# Patient Record
Sex: MALE | Race: OTHER | Employment: FULL TIME | URBAN - METROPOLITAN AREA
[De-identification: names, ages, dates, MRNs, and addresses within clinical notes are randomized per-mention and may not be internally consistent; named-entity substitution may affect disease eponyms.]

---

## 2018-05-02 ENCOUNTER — HOSPITAL ENCOUNTER (INPATIENT)
Facility: HOSPITAL | Age: 58
LOS: 7 days | Discharge: HOME HEALTH | DRG: 234 | End: 2018-05-09
Attending: INTERNAL MEDICINE | Admitting: THORACIC SURGERY (CARDIOTHORACIC VASCULAR SURGERY)
Payer: COMMERCIAL

## 2018-05-02 ENCOUNTER — APPOINTMENT (OUTPATIENT)
Dept: INTERVENTIONAL RADIOLOGY/VASCULAR | Facility: HOSPITAL | Age: 58
DRG: 234 | End: 2018-05-02
Attending: INTERNAL MEDICINE
Payer: COMMERCIAL

## 2018-05-02 PROCEDURE — 87081 CULTURE SCREEN ONLY: CPT | Performed by: INTERNAL MEDICINE

## 2018-05-02 PROCEDURE — 4A023N7 MEASUREMENT OF CARDIAC SAMPLING AND PRESSURE, LEFT HEART, PERCUTANEOUS APPROACH: ICD-10-PCS | Performed by: INTERNAL MEDICINE

## 2018-05-02 PROCEDURE — 93458 L HRT ARTERY/VENTRICLE ANGIO: CPT

## 2018-05-02 PROCEDURE — 85730 THROMBOPLASTIN TIME PARTIAL: CPT | Performed by: INTERNAL MEDICINE

## 2018-05-02 PROCEDURE — 99153 MOD SED SAME PHYS/QHP EA: CPT

## 2018-05-02 PROCEDURE — 5A02110 ASSISTANCE WITH CARDIAC OUTPUT USING BALLOON PUMP, INTERMITTENT: ICD-10-PCS | Performed by: INTERNAL MEDICINE

## 2018-05-02 PROCEDURE — B215YZZ FLUOROSCOPY OF LEFT HEART USING OTHER CONTRAST: ICD-10-PCS | Performed by: INTERNAL MEDICINE

## 2018-05-02 PROCEDURE — 33967 INSERT I-AORT PERCUT DEVICE: CPT

## 2018-05-02 PROCEDURE — 99152 MOD SED SAME PHYS/QHP 5/>YRS: CPT

## 2018-05-02 PROCEDURE — 93010 ELECTROCARDIOGRAM REPORT: CPT | Performed by: INTERNAL MEDICINE

## 2018-05-02 PROCEDURE — 80048 BASIC METABOLIC PNL TOTAL CA: CPT | Performed by: INTERNAL MEDICINE

## 2018-05-02 PROCEDURE — 85027 COMPLETE CBC AUTOMATED: CPT | Performed by: INTERNAL MEDICINE

## 2018-05-02 PROCEDURE — B211YZZ FLUOROSCOPY OF MULTIPLE CORONARY ARTERIES USING OTHER CONTRAST: ICD-10-PCS | Performed by: INTERNAL MEDICINE

## 2018-05-02 PROCEDURE — 93005 ELECTROCARDIOGRAM TRACING: CPT

## 2018-05-02 RX ORDER — ATORVASTATIN CALCIUM 40 MG/1
40 TABLET, FILM COATED ORAL NIGHTLY
Status: DISCONTINUED | OUTPATIENT
Start: 2018-05-02 | End: 2018-05-09

## 2018-05-02 RX ORDER — METOPROLOL TARTRATE 5 MG/5ML
INJECTION INTRAVENOUS
Status: COMPLETED
Start: 2018-05-02 | End: 2018-05-02

## 2018-05-02 RX ORDER — ASPIRIN 325 MG
325 TABLET, DELAYED RELEASE (ENTERIC COATED) ORAL DAILY
Status: DISCONTINUED | OUTPATIENT
Start: 2018-05-03 | End: 2018-05-04

## 2018-05-02 RX ORDER — LIDOCAINE HYDROCHLORIDE 10 MG/ML
INJECTION, SOLUTION EPIDURAL; INFILTRATION; INTRACAUDAL; PERINEURAL
Status: COMPLETED
Start: 2018-05-02 | End: 2018-05-02

## 2018-05-02 RX ORDER — HEPARIN SODIUM 5000 [USP'U]/ML
INJECTION, SOLUTION INTRAVENOUS; SUBCUTANEOUS
Status: COMPLETED
Start: 2018-05-02 | End: 2018-05-02

## 2018-05-02 RX ORDER — HEPARIN SODIUM 5000 [USP'U]/ML
5000 INJECTION INTRAVENOUS; SUBCUTANEOUS ONCE
Status: COMPLETED | OUTPATIENT
Start: 2018-05-02 | End: 2018-05-02

## 2018-05-02 RX ORDER — HEPARIN SODIUM AND DEXTROSE 10000; 5 [USP'U]/100ML; G/100ML
12 INJECTION INTRAVENOUS ONCE
Status: COMPLETED | OUTPATIENT
Start: 2018-05-02 | End: 2018-05-02

## 2018-05-02 RX ORDER — MIDAZOLAM HYDROCHLORIDE 1 MG/ML
INJECTION INTRAMUSCULAR; INTRAVENOUS
Status: COMPLETED
Start: 2018-05-02 | End: 2018-05-02

## 2018-05-02 RX ORDER — HEPARIN SODIUM AND DEXTROSE 10000; 5 [USP'U]/100ML; G/100ML
INJECTION INTRAVENOUS CONTINUOUS
Status: DISCONTINUED | OUTPATIENT
Start: 2018-05-03 | End: 2018-05-04

## 2018-05-03 ENCOUNTER — APPOINTMENT (OUTPATIENT)
Dept: ULTRASOUND IMAGING | Facility: HOSPITAL | Age: 58
DRG: 234 | End: 2018-05-03
Attending: PHYSICIAN ASSISTANT
Payer: COMMERCIAL

## 2018-05-03 ENCOUNTER — APPOINTMENT (OUTPATIENT)
Dept: CV DIAGNOSTICS | Facility: HOSPITAL | Age: 58
DRG: 234 | End: 2018-05-03
Attending: INTERNAL MEDICINE
Payer: COMMERCIAL

## 2018-05-03 ENCOUNTER — ANESTHESIA EVENT (OUTPATIENT)
Dept: CARDIAC SURGERY | Facility: HOSPITAL | Age: 58
DRG: 234 | End: 2018-05-03
Payer: COMMERCIAL

## 2018-05-03 PROCEDURE — 85025 COMPLETE CBC W/AUTO DIFF WBC: CPT | Performed by: INTERNAL MEDICINE

## 2018-05-03 PROCEDURE — 80053 COMPREHEN METABOLIC PANEL: CPT | Performed by: INTERNAL MEDICINE

## 2018-05-03 PROCEDURE — 84484 ASSAY OF TROPONIN QUANT: CPT | Performed by: INTERNAL MEDICINE

## 2018-05-03 PROCEDURE — 86850 RBC ANTIBODY SCREEN: CPT | Performed by: INTERNAL MEDICINE

## 2018-05-03 PROCEDURE — 83036 HEMOGLOBIN GLYCOSYLATED A1C: CPT | Performed by: PHYSICIAN ASSISTANT

## 2018-05-03 PROCEDURE — 93306 TTE W/DOPPLER COMPLETE: CPT | Performed by: INTERNAL MEDICINE

## 2018-05-03 PROCEDURE — 93010 ELECTROCARDIOGRAM REPORT: CPT | Performed by: INTERNAL MEDICINE

## 2018-05-03 PROCEDURE — 93005 ELECTROCARDIOGRAM TRACING: CPT

## 2018-05-03 PROCEDURE — 86901 BLOOD TYPING SEROLOGIC RH(D): CPT | Performed by: INTERNAL MEDICINE

## 2018-05-03 PROCEDURE — 85730 THROMBOPLASTIN TIME PARTIAL: CPT | Performed by: INTERNAL MEDICINE

## 2018-05-03 PROCEDURE — 93880 EXTRACRANIAL BILAT STUDY: CPT | Performed by: PHYSICIAN ASSISTANT

## 2018-05-03 PROCEDURE — 86920 COMPATIBILITY TEST SPIN: CPT

## 2018-05-03 PROCEDURE — 85049 AUTOMATED PLATELET COUNT: CPT | Performed by: INTERNAL MEDICINE

## 2018-05-03 PROCEDURE — 85610 PROTHROMBIN TIME: CPT | Performed by: THORACIC SURGERY (CARDIOTHORACIC VASCULAR SURGERY)

## 2018-05-03 PROCEDURE — 85576 BLOOD PLATELET AGGREGATION: CPT | Performed by: THORACIC SURGERY (CARDIOTHORACIC VASCULAR SURGERY)

## 2018-05-03 PROCEDURE — 86900 BLOOD TYPING SEROLOGIC ABO: CPT | Performed by: INTERNAL MEDICINE

## 2018-05-03 RX ORDER — DIPHENHYDRAMINE HYDROCHLORIDE 50 MG/ML
12.5 INJECTION INTRAMUSCULAR; INTRAVENOUS EVERY 4 HOURS PRN
Status: DISCONTINUED | OUTPATIENT
Start: 2018-05-03 | End: 2018-05-04

## 2018-05-03 RX ORDER — DIPHENHYDRAMINE HCL 25 MG
25 CAPSULE ORAL EVERY 4 HOURS PRN
Status: DISCONTINUED | OUTPATIENT
Start: 2018-05-03 | End: 2018-05-04

## 2018-05-03 RX ORDER — HYDROCODONE BITARTRATE AND ACETAMINOPHEN 5; 325 MG/1; MG/1
1 TABLET ORAL EVERY 6 HOURS PRN
Status: DISCONTINUED | OUTPATIENT
Start: 2018-05-03 | End: 2018-05-04

## 2018-05-03 RX ORDER — POTASSIUM CHLORIDE 20 MEQ/1
40 TABLET, EXTENDED RELEASE ORAL ONCE
Status: COMPLETED | OUTPATIENT
Start: 2018-05-03 | End: 2018-05-03

## 2018-05-03 RX ORDER — SODIUM CHLORIDE 9 MG/ML
INJECTION, SOLUTION INTRAVENOUS CONTINUOUS
Status: DISCONTINUED | OUTPATIENT
Start: 2018-05-03 | End: 2018-05-04

## 2018-05-03 RX ORDER — ALPRAZOLAM 0.5 MG/1
0.5 TABLET ORAL EVERY 8 HOURS PRN
Status: DISCONTINUED | OUTPATIENT
Start: 2018-05-03 | End: 2018-05-09

## 2018-05-03 RX ORDER — HYDROCODONE BITARTRATE AND ACETAMINOPHEN 5; 325 MG/1; MG/1
2 TABLET ORAL EVERY 4 HOURS PRN
Status: DISCONTINUED | OUTPATIENT
Start: 2018-05-03 | End: 2018-05-04

## 2018-05-03 NOTE — PROGRESS NOTES
Met with patient, wife and sister to discuss pre op teaching, post op expectations, discharge planning, binder given. Pt works in a factor, fairly laborious job, wife does not work and can be home with him on d/c.  Discussed roles of cm/sw, cardiac rahab,

## 2018-05-03 NOTE — PLAN OF CARE
CARDIOVASCULAR - ADULT    • Maintains optimal cardiac output and hemodynamic stability Progressing    • Absence of cardiac arrhythmias or at baseline Progressing        Assumed care of pt as direct admit from [facility] [place] at 2100.  Dr. Reuben Guzmán at pt

## 2018-05-03 NOTE — H&P
.  CC: NSTEMI, chest discomfort     PCP: Erica Lozano MD    History of Present Illness: Patient is a 62year old male who does not see doctors regularly, presented with chest discomfort to [facility]. Felt like heartburn.  Ultimately, was trans CREATSERUM  1.02  1.02   GLU  89  90   CA  8.6  8.6       Recent Labs   Lab  05/03/18   0621   ALT  34   AST  90*   ALB  3.3*       Recent Labs   Lab  05/03/18   0430   TROP  14.000*       Additional Diagnostics: personally reviewed EKG- minimal LILIAM in p Right ICA/CCA Velocity Ratio:  0.8                           Left CCA Peak Systolic Velocity:  82 cm/s             Left Proximal ICA Peak Systolic Velocity:  06.07 cm/s             Left Mid ICA Peak Systolic Velocity:  47.75 cm/s             Lef

## 2018-05-03 NOTE — PROGRESS NOTES
Post Procedure Note  Left heart catheterization, left ventricular gram, bilateral selective coronary angiogram, right iliofemoral angiogram, intra-aortic balloon pump.   Cath reveals totally occluded LAD, totally occluded RCA and distal left main 50-60%–haz

## 2018-05-03 NOTE — CONSULTS
[address]    PATIENT'S NAME: Salvatore Adams   ATTENDING PHYSICIAN: Nikolas Jiang. MIREILLE Monteiro: Mario Alberto Gee M.D.    PATIENT ACCOUNT#:   [de-identified]    LOCATION:  [facility] 1 EDWP 10  MEDICAL RECORD #:   [id number]       DATE Erica Noel conclusion of the LV gram, again hemodynamic pressure measurements were obtained, and a pressure recording was performed during an aortic valve pullback.   After central aortic pressure measurements were obtained, the catheter and sheath were removed, hemos balloon pump was placed in the descending aorta over the small J-wire. The device was taped to the leg, covered with sterile Tegaderm dressing. Patient returned to a stable satisfactory condition. CONCLUSION:  Intra-aortic balloon placement.     Dict

## 2018-05-03 NOTE — PROGRESS NOTES
[facility] Cardiology Progress Note - Jordon Bacon Patient Status:  Inpatient    1960 MRN [id number]   [facility] 6NE-A Attending Vimal Mcdowell MD   Hosp Day # 1 PCP Lars Costa MD     Subjective:   The patie effort. Abdomen: Soft, non-tender. No organosplenomegally, mass or rebound, BS-present. Extremities: Without clubbing, cyanosis or edema. Peripheral pulses are 2+. Neurologic: Alert and oriented, normal affect. No motor or coordinational deficit.   Skin negative  Respiratory: denies shortness of breath, wheezing or cough   Cardiovascular:  See HPI  GI: denies nausea, vomiting,   Genital/: no dysuria,   Musculoskeletal: no joint complaints upper or lower extremities  Neuro: no sensory or motor complaint

## 2018-05-03 NOTE — CONSULTS
[facility] Cardiology Consultation NoteAlan Goodman MD    The patient was interviewed, examined, the chart was reviewed and the consult was dictated. This is a 62year old male with a chief complaint of chest pain. Impression:  1.   Non-ST segment elevation MI

## 2018-05-03 NOTE — CONSULTS
[facility]    PATIENT'S NAME: Natali Barker   ATTENDING PHYSICIAN: Garrett Pope. Adiel Linares M.D.   [facility] Amanda: Daniel García M.D.    PATIENT ACCOUNT#:   [de-identified]    [facility]  MEDICAL RECORD #:   [id number]       DATE Nora Campa sounds. HEART:  There is no jugular venous distention, carotid bruit, third heart sound, or murmur. ABDOMEN:  Soft, nontender, without mass or organomegaly. EXTREMITIES:  Without clubbing, cyanosis, or edema. Distal pulses are strong, 3+ bilaterally.

## 2018-05-03 NOTE — PROGRESS NOTES
CV Surgery       Pt seen and examined by Dr Lilly Spurling. P2Y12 results = 122. Plan for CABG tomorrow instead of today to minimize risk of bleeding. Pt and family understand.          William Martinez PA-C/Mavis   5/3/2018  8:09 AM

## 2018-05-03 NOTE — ANESTHESIA PREPROCEDURE EVALUATION
PRE-OP EVALUATION    Patient Name: Betsy Iyer    Pre-op Diagnosis: CAD    Procedure(s):  IABP                                        ip [id number]    Surgeon(s) and Role:     * Vivi Tucker MD - Primary    Pre-op vitals reviewed.   Temp: 98 °F (36.7 °C)  Pu [COMPLETED] Heparin Sodium (Porcine) 5000 UNIT/ML injection      metoprolol Tartrate (LOPRESSOR) tab 25 mg 25 mg Oral 2x Daily(Beta Blocker)   atorvastatin (LIPITOR) tab 40 mg 40 mg Oral Nightly   aspirin EC EC tab 325 mg 325 mg Oral Daily   [COMPLETED] He Comment: smoker                         Neuro/Psych      (+) depression  (+) anxiety                            Past Surgical History:  No date: TONSILLECTOMY     Smoking status: Current Every Day Smoker  0.50 Packs/day  For 35.00 Years     Smokeless tobac Use of blood product(s) discussed with: patient              Present on Admission:  **None**

## 2018-05-04 ENCOUNTER — ANESTHESIA (OUTPATIENT)
Dept: CARDIAC SURGERY | Facility: HOSPITAL | Age: 58
DRG: 234 | End: 2018-05-04
Payer: COMMERCIAL

## 2018-05-04 ENCOUNTER — SURGERY (OUTPATIENT)
Age: 58
End: 2018-05-04

## 2018-05-04 ENCOUNTER — APPOINTMENT (OUTPATIENT)
Dept: GENERAL RADIOLOGY | Facility: HOSPITAL | Age: 58
DRG: 234 | End: 2018-05-04
Attending: THORACIC SURGERY (CARDIOTHORACIC VASCULAR SURGERY)
Payer: COMMERCIAL

## 2018-05-04 PROCEDURE — P9045 ALBUMIN (HUMAN), 5%, 250 ML: HCPCS

## 2018-05-04 PROCEDURE — 82330 ASSAY OF CALCIUM: CPT

## 2018-05-04 PROCEDURE — 85347 COAGULATION TIME ACTIVATED: CPT

## 2018-05-04 PROCEDURE — 94002 VENT MGMT INPAT INIT DAY: CPT

## 2018-05-04 PROCEDURE — 82375 ASSAY CARBOXYHB QUANT: CPT | Performed by: THORACIC SURGERY (CARDIOTHORACIC VASCULAR SURGERY)

## 2018-05-04 PROCEDURE — 85576 BLOOD PLATELET AGGREGATION: CPT | Performed by: PHYSICIAN ASSISTANT

## 2018-05-04 PROCEDURE — 5A1221Z PERFORMANCE OF CARDIAC OUTPUT, CONTINUOUS: ICD-10-PCS | Performed by: THORACIC SURGERY (CARDIOTHORACIC VASCULAR SURGERY)

## 2018-05-04 PROCEDURE — 84132 ASSAY OF SERUM POTASSIUM: CPT | Performed by: INTERNAL MEDICINE

## 2018-05-04 PROCEDURE — S0028 INJECTION, FAMOTIDINE, 20 MG: HCPCS

## 2018-05-04 PROCEDURE — 93005 ELECTROCARDIOGRAM TRACING: CPT

## 2018-05-04 PROCEDURE — 84443 ASSAY THYROID STIM HORMONE: CPT | Performed by: INTERNAL MEDICINE

## 2018-05-04 PROCEDURE — 71045 X-RAY EXAM CHEST 1 VIEW: CPT | Performed by: THORACIC SURGERY (CARDIOTHORACIC VASCULAR SURGERY)

## 2018-05-04 PROCEDURE — 85730 THROMBOPLASTIN TIME PARTIAL: CPT | Performed by: INTERNAL MEDICINE

## 2018-05-04 PROCEDURE — 02100Z9 BYPASS CORONARY ARTERY, ONE ARTERY FROM LEFT INTERNAL MAMMARY, OPEN APPROACH: ICD-10-PCS | Performed by: THORACIC SURGERY (CARDIOTHORACIC VASCULAR SURGERY)

## 2018-05-04 PROCEDURE — 85049 AUTOMATED PLATELET COUNT: CPT | Performed by: THORACIC SURGERY (CARDIOTHORACIC VASCULAR SURGERY)

## 2018-05-04 PROCEDURE — 85014 HEMATOCRIT: CPT

## 2018-05-04 PROCEDURE — 80048 BASIC METABOLIC PNL TOTAL CA: CPT | Performed by: THORACIC SURGERY (CARDIOTHORACIC VASCULAR SURGERY)

## 2018-05-04 PROCEDURE — 84132 ASSAY OF SERUM POTASSIUM: CPT

## 2018-05-04 PROCEDURE — 85027 COMPLETE CBC AUTOMATED: CPT | Performed by: THORACIC SURGERY (CARDIOTHORACIC VASCULAR SURGERY)

## 2018-05-04 PROCEDURE — 84295 ASSAY OF SERUM SODIUM: CPT

## 2018-05-04 PROCEDURE — 82803 BLOOD GASES ANY COMBINATION: CPT | Performed by: THORACIC SURGERY (CARDIOTHORACIC VASCULAR SURGERY)

## 2018-05-04 PROCEDURE — 93010 ELECTROCARDIOGRAM REPORT: CPT | Performed by: INTERNAL MEDICINE

## 2018-05-04 PROCEDURE — 85018 HEMOGLOBIN: CPT | Performed by: THORACIC SURGERY (CARDIOTHORACIC VASCULAR SURGERY)

## 2018-05-04 PROCEDURE — 82803 BLOOD GASES ANY COMBINATION: CPT

## 2018-05-04 PROCEDURE — S0028 INJECTION, FAMOTIDINE, 20 MG: HCPCS | Performed by: THORACIC SURGERY (CARDIOTHORACIC VASCULAR SURGERY)

## 2018-05-04 PROCEDURE — 85730 THROMBOPLASTIN TIME PARTIAL: CPT | Performed by: THORACIC SURGERY (CARDIOTHORACIC VASCULAR SURGERY)

## 2018-05-04 PROCEDURE — 83050 HGB METHEMOGLOBIN QUAN: CPT | Performed by: THORACIC SURGERY (CARDIOTHORACIC VASCULAR SURGERY)

## 2018-05-04 PROCEDURE — 85049 AUTOMATED PLATELET COUNT: CPT | Performed by: INTERNAL MEDICINE

## 2018-05-04 PROCEDURE — 82962 GLUCOSE BLOOD TEST: CPT

## 2018-05-04 PROCEDURE — 85384 FIBRINOGEN ACTIVITY: CPT | Performed by: THORACIC SURGERY (CARDIOTHORACIC VASCULAR SURGERY)

## 2018-05-04 PROCEDURE — 06BP0ZZ EXCISION OF RIGHT SAPHENOUS VEIN, OPEN APPROACH: ICD-10-PCS | Performed by: THORACIC SURGERY (CARDIOTHORACIC VASCULAR SURGERY)

## 2018-05-04 PROCEDURE — 80061 LIPID PANEL: CPT | Performed by: INTERNAL MEDICINE

## 2018-05-04 PROCEDURE — 36430 TRANSFUSION BLD/BLD COMPNT: CPT | Performed by: THORACIC SURGERY (CARDIOTHORACIC VASCULAR SURGERY)

## 2018-05-04 PROCEDURE — 86927 PLASMA FRESH FROZEN: CPT

## 2018-05-04 PROCEDURE — 85610 PROTHROMBIN TIME: CPT | Performed by: THORACIC SURGERY (CARDIOTHORACIC VASCULAR SURGERY)

## 2018-05-04 PROCEDURE — 021109W BYPASS CORONARY ARTERY, TWO ARTERIES FROM AORTA WITH AUTOLOGOUS VENOUS TISSUE, OPEN APPROACH: ICD-10-PCS | Performed by: THORACIC SURGERY (CARDIOTHORACIC VASCULAR SURGERY)

## 2018-05-04 PROCEDURE — 83735 ASSAY OF MAGNESIUM: CPT | Performed by: THORACIC SURGERY (CARDIOTHORACIC VASCULAR SURGERY)

## 2018-05-04 RX ORDER — FAMOTIDINE 10 MG/ML
20 INJECTION, SOLUTION INTRAVENOUS 2 TIMES DAILY
Status: DISCONTINUED | OUTPATIENT
Start: 2018-05-04 | End: 2018-05-09 | Stop reason: HOSPADM

## 2018-05-04 RX ORDER — MORPHINE SULFATE 4 MG/ML
2 INJECTION, SOLUTION INTRAMUSCULAR; INTRAVENOUS
Status: DISCONTINUED | OUTPATIENT
Start: 2018-05-04 | End: 2018-05-09

## 2018-05-04 RX ORDER — CEFAZOLIN SODIUM/WATER 2 G/20 ML
2 SYRINGE (ML) INTRAVENOUS EVERY 8 HOURS
Status: COMPLETED | OUTPATIENT
Start: 2018-05-04 | End: 2018-05-06

## 2018-05-04 RX ORDER — MORPHINE SULFATE 4 MG/ML
8 INJECTION, SOLUTION INTRAMUSCULAR; INTRAVENOUS
Status: DISCONTINUED | OUTPATIENT
Start: 2018-05-04 | End: 2018-05-09

## 2018-05-04 RX ORDER — ASPIRIN 325 MG
325 TABLET, DELAYED RELEASE (ENTERIC COATED) ORAL DAILY
Status: DISCONTINUED | OUTPATIENT
Start: 2018-05-05 | End: 2018-05-09

## 2018-05-04 RX ORDER — MORPHINE SULFATE 4 MG/ML
4 INJECTION, SOLUTION INTRAMUSCULAR; INTRAVENOUS
Status: DISCONTINUED | OUTPATIENT
Start: 2018-05-04 | End: 2018-05-09

## 2018-05-04 RX ORDER — HYDROCODONE BITARTRATE AND ACETAMINOPHEN 10; 325 MG/1; MG/1
2 TABLET ORAL EVERY 4 HOURS PRN
Status: DISCONTINUED | OUTPATIENT
Start: 2018-05-04 | End: 2018-05-09

## 2018-05-04 RX ORDER — ALBUMIN, HUMAN INJ 5% 5 %
250 SOLUTION INTRAVENOUS ONCE AS NEEDED
Status: COMPLETED | OUTPATIENT
Start: 2018-05-04 | End: 2018-05-05

## 2018-05-04 RX ORDER — ASPIRIN 325 MG
325 TABLET ORAL ONCE
Status: COMPLETED | OUTPATIENT
Start: 2018-05-04 | End: 2018-05-04

## 2018-05-04 RX ORDER — IPRATROPIUM BROMIDE AND ALBUTEROL SULFATE 2.5; .5 MG/3ML; MG/3ML
3 SOLUTION RESPIRATORY (INHALATION) EVERY 4 HOURS PRN
Status: DISCONTINUED | OUTPATIENT
Start: 2018-05-04 | End: 2018-05-09

## 2018-05-04 RX ORDER — ASPIRIN 300 MG
300 SUPPOSITORY, RECTAL RECTAL ONCE
Status: COMPLETED | OUTPATIENT
Start: 2018-05-04 | End: 2018-05-04

## 2018-05-04 RX ORDER — DOCUSATE SODIUM 100 MG/1
100 CAPSULE, LIQUID FILLED ORAL 2 TIMES DAILY
Status: DISCONTINUED | OUTPATIENT
Start: 2018-05-04 | End: 2018-05-09

## 2018-05-04 RX ORDER — POTASSIUM CHLORIDE 29.8 MG/ML
40 INJECTION INTRAVENOUS AS NEEDED
Status: DISCONTINUED | OUTPATIENT
Start: 2018-05-04 | End: 2018-05-09

## 2018-05-04 RX ORDER — DEXTROSE MONOHYDRATE 25 G/50ML
50 INJECTION, SOLUTION INTRAVENOUS
Status: DISCONTINUED | OUTPATIENT
Start: 2018-05-04 | End: 2018-05-05

## 2018-05-04 RX ORDER — TEMAZEPAM 15 MG/1
15 CAPSULE ORAL NIGHTLY PRN
Status: DISCONTINUED | OUTPATIENT
Start: 2018-05-04 | End: 2018-05-09

## 2018-05-04 RX ORDER — HYDROCODONE BITARTRATE AND ACETAMINOPHEN 10; 325 MG/1; MG/1
1 TABLET ORAL EVERY 4 HOURS PRN
Status: DISCONTINUED | OUTPATIENT
Start: 2018-05-04 | End: 2018-05-09

## 2018-05-04 RX ORDER — MAGNESIUM SULFATE 1 G/100ML
1 INJECTION INTRAVENOUS AS NEEDED
Status: DISCONTINUED | OUTPATIENT
Start: 2018-05-04 | End: 2018-05-09

## 2018-05-04 RX ORDER — POLYETHYLENE GLYCOL 3350 17 G/17G
1 POWDER, FOR SOLUTION ORAL DAILY PRN
Status: DISCONTINUED | OUTPATIENT
Start: 2018-05-04 | End: 2018-05-09

## 2018-05-04 RX ORDER — BISACODYL 10 MG
10 SUPPOSITORY, RECTAL RECTAL
Status: DISCONTINUED | OUTPATIENT
Start: 2018-05-04 | End: 2018-05-09

## 2018-05-04 RX ORDER — DEXTROSE AND SODIUM CHLORIDE 5; .45 G/100ML; G/100ML
INJECTION, SOLUTION INTRAVENOUS CONTINUOUS
Status: ACTIVE | OUTPATIENT
Start: 2018-05-04 | End: 2018-05-05

## 2018-05-04 RX ORDER — ASPIRIN 300 MG
300 SUPPOSITORY, RECTAL RECTAL DAILY
Status: DISCONTINUED | OUTPATIENT
Start: 2018-05-05 | End: 2018-05-08

## 2018-05-04 RX ORDER — CEFAZOLIN SODIUM 1 G/3ML
INJECTION, POWDER, FOR SOLUTION INTRAMUSCULAR; INTRAVENOUS
Status: DISCONTINUED | OUTPATIENT
Start: 2018-05-04 | End: 2018-05-04 | Stop reason: HOSPADM

## 2018-05-04 RX ORDER — ONDANSETRON 2 MG/ML
4 INJECTION INTRAMUSCULAR; INTRAVENOUS EVERY 6 HOURS PRN
Status: DISCONTINUED | OUTPATIENT
Start: 2018-05-04 | End: 2018-05-09

## 2018-05-04 RX ORDER — CHLORHEXIDINE GLUCONATE 0.12 MG/ML
15 RINSE ORAL
Status: DISCONTINUED | OUTPATIENT
Start: 2018-05-04 | End: 2018-05-05

## 2018-05-04 RX ORDER — FAMOTIDINE 20 MG/1
20 TABLET ORAL 2 TIMES DAILY
Status: DISCONTINUED | OUTPATIENT
Start: 2018-05-04 | End: 2018-05-09

## 2018-05-04 RX ORDER — POTASSIUM CHLORIDE 14.9 MG/ML
20 INJECTION INTRAVENOUS AS NEEDED
Status: DISCONTINUED | OUTPATIENT
Start: 2018-05-04 | End: 2018-05-09

## 2018-05-04 RX ORDER — SODIUM CHLORIDE 9 MG/ML
INJECTION, SOLUTION INTRAVENOUS CONTINUOUS
Status: DISCONTINUED | OUTPATIENT
Start: 2018-05-04 | End: 2018-05-09

## 2018-05-04 RX ORDER — NITROGLYCERIN 20 MG/100ML
INJECTION INTRAVENOUS CONTINUOUS PRN
Status: DISCONTINUED | OUTPATIENT
Start: 2018-05-04 | End: 2018-05-09 | Stop reason: HOSPADM

## 2018-05-04 RX ORDER — DEXMEDETOMIDINE HYDROCHLORIDE 4 UG/ML
INJECTION, SOLUTION INTRAVENOUS CONTINUOUS
Status: DISCONTINUED | OUTPATIENT
Start: 2018-05-04 | End: 2018-05-09 | Stop reason: ALTCHOICE

## 2018-05-04 RX ORDER — MIDAZOLAM HYDROCHLORIDE 1 MG/ML
1 INJECTION INTRAMUSCULAR; INTRAVENOUS EVERY 30 MIN PRN
Status: DISCONTINUED | OUTPATIENT
Start: 2018-05-04 | End: 2018-05-09 | Stop reason: HOSPADM

## 2018-05-04 RX ORDER — DEXTROSE AND SODIUM CHLORIDE 5; .45 G/100ML; G/100ML
INJECTION, SOLUTION INTRAVENOUS CONTINUOUS
Status: ACTIVE | OUTPATIENT
Start: 2018-05-05 | End: 2018-05-05

## 2018-05-04 RX ORDER — MAGNESIUM SULFATE HEPTAHYDRATE 40 MG/ML
2 INJECTION, SOLUTION INTRAVENOUS AS NEEDED
Status: DISCONTINUED | OUTPATIENT
Start: 2018-05-04 | End: 2018-05-09

## 2018-05-04 NOTE — CONSULTS
ENDOCRINOLOGY CONSULTATION    Attending physician:  Min Rivers MD  Consulting physican:  Wilmer Ferrell MD    Admission Date:  5/2/2018  Consultation Date:  5/4/2018      Reason for consultation: Mgmt of hyperglycemia    Chief Complaint infusion 5-100 mcg/kg/min Intravenous Continuous   Dexmedetomidine HCl (PRECEDEX) 400 mcg in sodium chloride 0.9 % 100 mL infusion 0.2-1.5 mcg/kg/hr Intravenous Continuous   ipratropium-albuterol (DUONEB) nebulizer solution 3 mL 3 mL Nebulization Q4H PRN 10 mL 10 mL Oral BID PRN   PEG 3350 (MIRALAX) powder packet 17 g 1 packet Oral Daily PRN   bisacodyl (DULCOLAX) rectal suppository 10 mg 10 mg Rectal Daily PRN   ondansetron HCl (ZOFRAN) injection 4 mg 4 mg Intravenous Q6H PRN   famoTIDine (PEPCID) tab 20 MG/ML injection      [COMPLETED] Midazolam HCl (VERSED) 2 MG/2ML injection      [COMPLETED] iodixanol (VISIPAQUE) 320 MG/ML injection 100 mL 100 mL Injection ONCE PRN   [COMPLETED] Midazolam HCl (VERSED) 2 MG/2ML injection      [COMPLETED] metoprolol Tartr CHOLESTEROL, TOTAL      <200 mg/dL 144   Triglycerides      <150 mg/dL 127   HDL Cholesterol      >45 mg/dL 29 (L)   LDL Cholesterol Calc      <130 mg/dL 90         Component      Latest Ref Rng & Units 5/4/2018   TSH      0.350 - 5.500 mIU/mL 1.340

## 2018-05-04 NOTE — PLAN OF CARE
Assumed care of pt this am. Pt alert and oriented on Heparin gtt with IABP to right groin. Pt prepped and sent to CVOR. Pt returned from CVOR vented on precedex. Dr Doris Brunson and Dr Shey Tucker came to see pt.  Plan to remove IABP at 0500 and then wean to extubate

## 2018-05-04 NOTE — PROGRESS NOTES
[facility] Hospitalist Progress Note     PCP: Karen Lopez MD    CC:  Follow up    SUBJECTIVE:  Pt in OR at the time of my rounds. Pt not seen.  Chart reviewed    OBJECTIVE:  Temp:  [98 °F (36.7 °C)-98.2 °F (36.8 °C)] 98 °F (36.7 °C)  Pulse:  [59-78] 72  Re verapamil in plasmalyte, [MAR Hold] DiphenhydrAMINE HCl **OR** [MAR Hold] diphenhydrAMINE, [MAR Hold] HYDROcodone-acetaminophen **OR** [MAR Hold] HYDROcodone-acetaminophen, [MAR Hold] ALPRAZolam       Assessment/Plan:     NSTEMI, CAD  - trop to 13  - s/p c

## 2018-05-04 NOTE — PROGRESS NOTES
[facility] Cardiology Progress Note - Cuco Bacon Patient Status:  Inpatient    1960 MRN [id number]   [facility] 6NE-A Attending Cody Chang, *   Hosp Day # 2 PCP Jose You MD     Subjective: 16. 5   --    --    --   11.8*   MCV  88.6   --   87.0   --    --    --   88.3   PLT  226.0  235.0  237.0   --   199.0  162.0  170.0   INR   --    --    --   1.12*   --   1.61*   --        Recent Labs   Lab  05/02/18   1051  05/03/18   4299  05/04/18   067 DOBUTamine HCl (DOBUTREX) 250 mg in sodium chloride 0.9 % 250 mL infusion 2.5-40 mcg/kg/min Intravenous Continuous PRN   nitroGLYCERIN infusion 50mg in D5W 250ml 5-300 mcg/min Intravenous Continuous PRN   norepinephrine (LEVOPHED) 4 mg/250 ml premix infu sodium chloride 0.9 % 100 mL infusion 1-57 Units/hr Intravenous Continuous   glucose (DEX4) oral liquid 15 g 15 g Oral Q15 Min PRN   Or      Glucose-Vitamin C (DEX-4) 4-0.006 g chewable tab 4 tablet 4 tablet Oral Q15 Min PRN   Or      dextrose 50 % injecti

## 2018-05-04 NOTE — DIETARY NOTE
Nutrition Short Note   Dietitian consult received per cardiac rehab standing order. Pt to be educated by cardiac rehab staff once appropriate and encouraged to attend outpatient classes taught by RD for full diet ed. RD available PRN.      Miguel Mercado RD,

## 2018-05-04 NOTE — PLAN OF CARE
CARDIOVASCULAR - ADULT    • Maintains optimal cardiac output and hemodynamic stability Progressing    • Absence of cardiac arrhythmias or at baseline Progressing        Assumed care of pt at 1930. NSR noted on the monitor.  Heparin gtt titrated according to

## 2018-05-04 NOTE — ANESTHESIA POSTPROCEDURE EVALUATION
[facility] Patient Status:  Inpatient   Age/Gender 62year old male MRN [id number]   [facility] 6NE-A Attending Malina Marin, [facility] Day # 2 PCP Chidi Segura MD       Anesthesia Post-op Note    Procedure(

## 2018-05-04 NOTE — PROGRESS NOTES
Anesthesia Ventilator Management    Patient is s/p CABG 3V  POD#0. Patient is currently sedated and intubated. Vent settings: PRVC 500/14/40%/5  ABG, CXR pending    Kenyon@[employer]  @2  Dex@0.4    Plan for extubation after CPAP trial in AM after IABP removed.

## 2018-05-05 ENCOUNTER — APPOINTMENT (OUTPATIENT)
Dept: GENERAL RADIOLOGY | Facility: HOSPITAL | Age: 58
DRG: 234 | End: 2018-05-05
Attending: THORACIC SURGERY (CARDIOTHORACIC VASCULAR SURGERY)
Payer: COMMERCIAL

## 2018-05-05 PROCEDURE — 94003 VENT MGMT INPAT SUBQ DAY: CPT

## 2018-05-05 PROCEDURE — 94150 VITAL CAPACITY TEST: CPT

## 2018-05-05 PROCEDURE — 84295 ASSAY OF SERUM SODIUM: CPT | Performed by: ANESTHESIOLOGY

## 2018-05-05 PROCEDURE — 71045 X-RAY EXAM CHEST 1 VIEW: CPT | Performed by: THORACIC SURGERY (CARDIOTHORACIC VASCULAR SURGERY)

## 2018-05-05 PROCEDURE — 83050 HGB METHEMOGLOBIN QUAN: CPT | Performed by: THORACIC SURGERY (CARDIOTHORACIC VASCULAR SURGERY)

## 2018-05-05 PROCEDURE — 93005 ELECTROCARDIOGRAM TRACING: CPT

## 2018-05-05 PROCEDURE — 84132 ASSAY OF SERUM POTASSIUM: CPT | Performed by: ANESTHESIOLOGY

## 2018-05-05 PROCEDURE — 83735 ASSAY OF MAGNESIUM: CPT | Performed by: THORACIC SURGERY (CARDIOTHORACIC VASCULAR SURGERY)

## 2018-05-05 PROCEDURE — 83605 ASSAY OF LACTIC ACID: CPT | Performed by: ANESTHESIOLOGY

## 2018-05-05 PROCEDURE — 85049 AUTOMATED PLATELET COUNT: CPT | Performed by: INTERNAL MEDICINE

## 2018-05-05 PROCEDURE — 80048 BASIC METABOLIC PNL TOTAL CA: CPT | Performed by: THORACIC SURGERY (CARDIOTHORACIC VASCULAR SURGERY)

## 2018-05-05 PROCEDURE — 82962 GLUCOSE BLOOD TEST: CPT

## 2018-05-05 PROCEDURE — 82803 BLOOD GASES ANY COMBINATION: CPT | Performed by: ANESTHESIOLOGY

## 2018-05-05 PROCEDURE — 85025 COMPLETE CBC W/AUTO DIFF WBC: CPT | Performed by: THORACIC SURGERY (CARDIOTHORACIC VASCULAR SURGERY)

## 2018-05-05 PROCEDURE — 93010 ELECTROCARDIOGRAM REPORT: CPT | Performed by: INTERNAL MEDICINE

## 2018-05-05 PROCEDURE — 82375 ASSAY CARBOXYHB QUANT: CPT | Performed by: THORACIC SURGERY (CARDIOTHORACIC VASCULAR SURGERY)

## 2018-05-05 PROCEDURE — 82375 ASSAY CARBOXYHB QUANT: CPT | Performed by: ANESTHESIOLOGY

## 2018-05-05 PROCEDURE — P9045 ALBUMIN (HUMAN), 5%, 250 ML: HCPCS

## 2018-05-05 PROCEDURE — 85730 THROMBOPLASTIN TIME PARTIAL: CPT | Performed by: INTERNAL MEDICINE

## 2018-05-05 PROCEDURE — P9045 ALBUMIN (HUMAN), 5%, 250 ML: HCPCS | Performed by: THORACIC SURGERY (CARDIOTHORACIC VASCULAR SURGERY)

## 2018-05-05 PROCEDURE — 85018 HEMOGLOBIN: CPT | Performed by: THORACIC SURGERY (CARDIOTHORACIC VASCULAR SURGERY)

## 2018-05-05 PROCEDURE — 36600 WITHDRAWAL OF ARTERIAL BLOOD: CPT | Performed by: THORACIC SURGERY (CARDIOTHORACIC VASCULAR SURGERY)

## 2018-05-05 PROCEDURE — 83050 HGB METHEMOGLOBIN QUAN: CPT | Performed by: ANESTHESIOLOGY

## 2018-05-05 PROCEDURE — 82330 ASSAY OF CALCIUM: CPT | Performed by: ANESTHESIOLOGY

## 2018-05-05 PROCEDURE — 85018 HEMOGLOBIN: CPT | Performed by: ANESTHESIOLOGY

## 2018-05-05 PROCEDURE — S0028 INJECTION, FAMOTIDINE, 20 MG: HCPCS | Performed by: THORACIC SURGERY (CARDIOTHORACIC VASCULAR SURGERY)

## 2018-05-05 PROCEDURE — 82803 BLOOD GASES ANY COMBINATION: CPT | Performed by: THORACIC SURGERY (CARDIOTHORACIC VASCULAR SURGERY)

## 2018-05-05 RX ORDER — ALBUMIN, HUMAN INJ 5% 5 %
500 SOLUTION INTRAVENOUS ONCE
Status: COMPLETED | OUTPATIENT
Start: 2018-05-05 | End: 2018-05-05

## 2018-05-05 RX ORDER — DEXTROSE MONOHYDRATE 25 G/50ML
50 INJECTION, SOLUTION INTRAVENOUS
Status: DISCONTINUED | OUTPATIENT
Start: 2018-05-05 | End: 2018-05-09

## 2018-05-05 RX ORDER — ALBUMIN, HUMAN INJ 5% 5 %
SOLUTION INTRAVENOUS
Status: COMPLETED
Start: 2018-05-05 | End: 2018-05-05

## 2018-05-05 NOTE — PROGRESS NOTES
[facility]   [facility] Progress Note    Matilda Mcelroy Patient Status:  Inpatient    1960 MRN [id number]   [facility] 6NE-A Attending Loyd Colbert, *   Hosp Day # 3 PCP Martita Woody MD     Subjective:  Pt seen and examin cap 15 mg 15 mg Oral Nightly PRN   DOBUTamine HCl (DOBUTREX) 250 mg in sodium chloride 0.9 % 250 mL infusion 2.5-40 mcg/kg/min Intravenous Continuous PRN   nitroGLYCERIN infusion 50mg in D5W 250ml 5-300 mcg/min Intravenous Continuous PRN   norepinephrine ( Q15 Min PRN   Or      Glucose-Vitamin C (DEX-4) 4-0.006 g chewable tab 4 tablet 4 tablet Oral Q15 Min PRN   Or      dextrose 50 % injection 50 mL 50 mL Intravenous Q15 Min PRN   Or      glucose (DEX4) oral liquid 30 g 30 g Oral Q15 Min PRN   Or      Glucos control  - Activity  - Continue CCU care today    D/W Gaye Snider  5/5/2018  9:02 AM

## 2018-05-05 NOTE — OCCUPATIONAL THERAPY NOTE
Received order for OT evaluation. Pt is still intubated. OT will evaluate the patient when he is medically appropriate for therapy. Spoke with RN.

## 2018-05-05 NOTE — PROGRESS NOTES
[facility] Hospitalist Progress Note     PCP: Heena Mares MD    CC:  Follow up    SUBJECTIVE:  Pt s/p CABG yesterday. Pain to sternal region controlled. Tolerated diet. No sob/n/v/f/c currently.  IABP removed this AM.    OBJECTIVE:  Temp:  [98 °F (36.7 Recent Labs   Lab  05/03/18   0621   ALT  34   AST  90*   ALB  3.3*       Recent Labs   Lab  05/05/18   0804  05/05/18   0859  05/05/18   1019  05/05/18   1107  05/05/18   1204   PGLU  119*  98  131*  131*  123*       Recent Labs   Lab  05/03/18   04 BB   -levo  -management per cards and CV surgery (CT management)  -IV morphine prn (has required), norco prn. Bowel regimen, antiemetics    Leukocytosis-reactive, monitor    Anemia-expected, monitor    Hyperglycemia- endo on, appreciate.  Insulin per protoc

## 2018-05-05 NOTE — PLAN OF CARE
Assumed care 0730. Intubated, cordis, a-line, rich, chest tubes to suction. AZUL, following commands. Sternal dressing CDI. Right groin soft, no hematoma. Pedal pulses +1. SBP 80's, restarted levo.  A-line removed 0900, manual pressure held until hemostasis

## 2018-05-05 NOTE — PROGRESS NOTES
[facility] Cardiology  Progress Note    Liset Chan Patient Status:  Inpatient    1960 MRN [id number]   [facility] 6NE-A Attending Alo Travis, *   Hosp Day # 3 PCP Karen Lopez MD     Subjective:    Intubate magnesium hydroxide, PEG 3350, bisacodyl, ondansetron HCl, potassium chloride **OR** potassium chloride, calcium gluconate IVPB, Magnesium Sulfate in D5W, Magnesium Sulfate, glucose **OR** Glucose-Vitamin C **OR** dextrose **OR** glucose **OR** Glucose-Vit 4.1   CL  106   --   111  115*   CO2  23.0   --   24.0  25.0   ALKPHO  72   --    --    --    AST  90*   --    --    --    ALT  34   --    --    --    BILT  2.1*   --    --    --    TP  7.2   --    --    --        Recent [employer]   Lab  05/03/18   0621   05/04 the right. 6.       LEFT VENTRICULOGRAM:  The left ventriculogram was performed in the 30-degree WILSON angle. The apex of the left ventricle is akinetic, possible mildly dyskinetic. LV ejection fraction is 45% to 50%.   The right iliofemoral artery is [place]

## 2018-05-05 NOTE — PROGRESS NOTES
Anesthesia Post Op Check    Patient is s/p CABG 3V  POD# 1  Was extubated earlier today after successful CPAP trial.   SpO2= 97%. Encouraged incentive spirometry. Pt doing well. Denies nausea, pruritis, headaches and denies recall.   No questions re: heathers

## 2018-05-05 NOTE — PROGRESS NOTES
ENDOCRINOLOGY PROGRESS NOTE    Typed by Sharon Morrell MD on 5/5/2018      S:  Pt resting in bed. Already tolerating full diet. Feels fatigued.       O:  /58   Pulse 114   Temp 98 °F (36.7 °C) (Temporal)   Resp 24   Wt 196 lb 10.4 oz (89.2 k Assessment and Plan:    1. Postoperative hyperglycemia: controlled as the glucoses are stable.  HgA1 5.3%      · Adjust diabetic regimen as follows:        Change to Novolog postop cardiac SQ protocol with base dose of 11 Units with meals and ba

## 2018-05-05 NOTE — PHYSICAL THERAPY NOTE
PT consult received. Pt is still intubated. PT will evaluate the patient when he is medically appropriate for therapy. OT Spoke with RN.

## 2018-05-06 ENCOUNTER — APPOINTMENT (OUTPATIENT)
Dept: GENERAL RADIOLOGY | Facility: HOSPITAL | Age: 58
DRG: 234 | End: 2018-05-06
Attending: THORACIC SURGERY (CARDIOTHORACIC VASCULAR SURGERY)
Payer: COMMERCIAL

## 2018-05-06 PROCEDURE — 97530 THERAPEUTIC ACTIVITIES: CPT

## 2018-05-06 PROCEDURE — 85025 COMPLETE CBC W/AUTO DIFF WBC: CPT | Performed by: INTERNAL MEDICINE

## 2018-05-06 PROCEDURE — 94640 AIRWAY INHALATION TREATMENT: CPT

## 2018-05-06 PROCEDURE — 97116 GAIT TRAINING THERAPY: CPT

## 2018-05-06 PROCEDURE — 82962 GLUCOSE BLOOD TEST: CPT

## 2018-05-06 PROCEDURE — 83735 ASSAY OF MAGNESIUM: CPT | Performed by: HOSPITALIST

## 2018-05-06 PROCEDURE — 71045 X-RAY EXAM CHEST 1 VIEW: CPT | Performed by: THORACIC SURGERY (CARDIOTHORACIC VASCULAR SURGERY)

## 2018-05-06 PROCEDURE — 97167 OT EVAL HIGH COMPLEX 60 MIN: CPT

## 2018-05-06 PROCEDURE — 97162 PT EVAL MOD COMPLEX 30 MIN: CPT

## 2018-05-06 PROCEDURE — 80048 BASIC METABOLIC PNL TOTAL CA: CPT | Performed by: INTERNAL MEDICINE

## 2018-05-06 RX ORDER — METOPROLOL TARTRATE 50 MG/1
50 TABLET, FILM COATED ORAL
Status: DISCONTINUED | OUTPATIENT
Start: 2018-05-06 | End: 2018-05-09

## 2018-05-06 RX ORDER — IPRATROPIUM BROMIDE AND ALBUTEROL SULFATE 2.5; .5 MG/3ML; MG/3ML
3 SOLUTION RESPIRATORY (INHALATION)
Status: DISCONTINUED | OUTPATIENT
Start: 2018-05-06 | End: 2018-05-09

## 2018-05-06 RX ORDER — ENOXAPARIN SODIUM 100 MG/ML
40 INJECTION SUBCUTANEOUS DAILY
Status: DISCONTINUED | OUTPATIENT
Start: 2018-05-06 | End: 2018-05-08

## 2018-05-06 RX ORDER — IPRATROPIUM BROMIDE AND ALBUTEROL SULFATE 2.5; .5 MG/3ML; MG/3ML
3 SOLUTION RESPIRATORY (INHALATION)
Status: DISCONTINUED | OUTPATIENT
Start: 2018-05-06 | End: 2018-05-06

## 2018-05-06 NOTE — PLAN OF CARE
Assumed care 0715. Up in chair. Chest tubes, cordis. Pain controlled with RX. D/C chest tubes, rich and cordis, no complications. Expiratory wheezing, weak cough, encouraged IS and flutter valve. NC 4L. Up walking the unit.  See flow sheet for vitals and d

## 2018-05-06 NOTE — PROGRESS NOTES
[facility] Hospitalist Progress Note     PCP: Kaveh Renteria MD    CC:  Follow up    SUBJECTIVE:  Pt sitting up in chair. Pain to sternal region controlled. Tolerated diet. No sob/n/v/f/c currently.  +u, +flatus    OBJECTIVE:  Temp:  [97.8 °F (36.6 °C)-98 metoprolol tartrate  50 mg Oral 2x Daily(Beta Blocker)   • ipratropium-albuterol  3 mL Nebulization QID   • Insulin Aspart Pen  1-20 Units Subcutaneous TID AC   • Insulin Aspart Pen  1-25 Units Subcutaneous Once   • aspirin EC  325 mg Oral Daily    Or   • monitor.  Resolved    Hypernatremia- mild, monitor     Tob use- pt has anxiolytics prn ordered, when ok with cards, could consider nicotine patch     SCDs, ppx anticoagulation when ok with CV surgery  PT rec HHPT  Questions/concerns were discussed with gela

## 2018-05-06 NOTE — PROGRESS NOTES
[facility]   [facility]S Progress Note    Gideon Morales Patient Status:  Inpatient    1960 MRN [id number]   [facility] 6NE-A Attending Matilda Peace, *   Hosp Day # 4 PCP Kaitlin Linda MD     Subjective:  Pt seen and examin ipratropium-albuterol (DUONEB) nebulizer solution 3 mL 3 mL Nebulization Q4H PRN   0.9%  NaCl infusion  Intravenous Continuous   HYDROcodone-acetaminophen (NORCO)  MG per tab 1 tablet 1 tablet Oral Q4H PRN   Or      HYDROcodone-acetaminophen (NORCO Magnesium Sulfate in D5W IVPB premix 1 g 1 g Intravenous PRN   Magnesium Sulfate IVPB premix SOLN 2 g 2 g Intravenous PRN   mupirocin (BACTROBAN) 2% nasal ointment OINT 1 Application 1 Application Nasal BID   Insulin Regular Human (NOVOLIN R) 100 Units i

## 2018-05-06 NOTE — PROGRESS NOTES
[facility] Cardiology  Progress Note    Amber Chen Patient Status:  Inpatient    1960 MRN [id number]   [facility] 6NE-A Attending Bob Mojica, *   Hosp Day # 4 PCP Cyndee Biswas MD     Subjective:   Up in ch sulfate, temazepam, DOBUTamine, Nitroglycerin in D5W, norepinephrine, nitroprusside (NIPRIDE) 50 mg in D5W infusion, magnesium hydroxide, PEG 3350, bisacodyl, ondansetron HCl, potassium chloride **OR** potassium chloride, calcium gluconate IVPB, Magnesium Recent Labs   Lab  05/04/18   1512  05/05/18   0355  05/06/18   0451   RBC  3.94*  3.61*  3.16*   HGB  11.8*  11.3*  9.4*   HCT  34.8*  32.3*  29.2*   MCV  88.3  89.5  92.4   MCH  29.9  31.3  29.7   MCHC  33.9  35.0  32.2   RDW  12.9  12.7  13.2   NE Systolic function was at the lower limits of normal. The estimated     ejection fraction was 50-55%. Hypokinesis. Doppler parameters are     consistent with abnormal left ventricular relaxation - grade 1 diastolic     dysfunction.   2. Regional wall motion

## 2018-05-06 NOTE — PROGRESS NOTES
ENDOCRINOLOGY PROGRESS NOTE    Typed by Malcom Hurst MD on 5/6/2018      S:  Pt sitting up in the chair eating lunch. Walked in the hallways today. Feeling better but says \"I don't want to overdo it and have a setback\".       O: /93   Pu Platelet Count      387.4 - 450.0 10(3)uL 204.0         Assessment and Plan:    1. Postoperative hyperglycemia: controlled as the glucoses are stable with mild elevation and needing some coverage.  HgA1 5.3%      · Adjust diabetic regimen as follows:

## 2018-05-06 NOTE — PHYSICAL THERAPY NOTE
PHYSICAL THERAPY EVALUATION - INPATIENT     Room Number: 6273/8900-C  Evaluation Date: 5/6/2018  Type of Evaluation: Initial  Physician Order: PT Eval and Treat    Presenting Problem: NSTEMI s/p CABG 5/4/18  Reason for Therapy: Mobility Dysfunction a strength is within functional limits     BALANCE  Static Sitting: Fair +  Dynamic Sitting: Fair +  Static Standing: Fair -  Dynamic Standing: Poor +    ADDITIONAL TESTS                                    NEUROLOGICAL FINDINGS                      ACTIVITY without device. RN notified of this. CGA with stand>sit into chair with cues to minimize WB through BUEs per sternal precautions.      Exercise/Education Provided:  Bed mobility  Gait training  Transfer training    Patient End of Session: Up in chair;Needs verbalize sternal precautions and maintain precautions while mobilizing   Goal #6 Pt is able to demonstrate independence and compliance with HEP per CV binder   Goal Comments: Goals established on 5/6/2018

## 2018-05-07 PROCEDURE — 94640 AIRWAY INHALATION TREATMENT: CPT

## 2018-05-07 PROCEDURE — 97110 THERAPEUTIC EXERCISES: CPT

## 2018-05-07 PROCEDURE — 85025 COMPLETE CBC W/AUTO DIFF WBC: CPT | Performed by: HOSPITALIST

## 2018-05-07 PROCEDURE — 80048 BASIC METABOLIC PNL TOTAL CA: CPT | Performed by: HOSPITALIST

## 2018-05-07 PROCEDURE — 82962 GLUCOSE BLOOD TEST: CPT

## 2018-05-07 PROCEDURE — 94668 MNPJ CHEST WALL SBSQ: CPT

## 2018-05-07 PROCEDURE — 83735 ASSAY OF MAGNESIUM: CPT | Performed by: HOSPITALIST

## 2018-05-07 PROCEDURE — 94667 MNPJ CHEST WALL 1ST: CPT

## 2018-05-07 PROCEDURE — 97535 SELF CARE MNGMENT TRAINING: CPT

## 2018-05-07 PROCEDURE — 97116 GAIT TRAINING THERAPY: CPT

## 2018-05-07 RX ORDER — FUROSEMIDE 10 MG/ML
20 INJECTION INTRAMUSCULAR; INTRAVENOUS
Status: COMPLETED | OUTPATIENT
Start: 2018-05-07 | End: 2018-05-09

## 2018-05-07 NOTE — PROGRESS NOTES
ENDOCRINOLOGY PROGRESS NOTE    Typed by Hugh Yang MD on 5/7/2018      S:  Pt sitting up in the chair. Feels a little fatigued but walking the halls. Awaiting transfer to stepdown floor.       O: /84 (BP Location: Right arm)   Pulse 95 150.0 - 450.0 10(3)uL 210.0           Assessment and Plan:    1. Postoperative hyperglycemia: controlled as the glucoses are stable.  HgA1 5.3%      · Continue diabetic regimen as follows:          Novolog 1 Unit for every 10 grams of carbs with meals

## 2018-05-07 NOTE — OPERATIVE REPORT
[address]    PATIENT'S NAME: Magdaleno ANGELES   ATTENDING PHYSICIAN: Margot Murillo.  Erica Macias MD   OPERATING PHYSICIAN: Lesli Mondragon MD   PATIENT ACCOUNT#:   [id number]    LOCATION:  [address] [place] [address]  MEDICAL RECORD #:   [id number]       DATE OF BIRTH:  [birth year] artery was taken down. Greater saphenous graft was harvested from the leg. Initially, we looked in the left leg. This vein was of poor quality. As such, we moved to the right leg, where a reasonable quality vein was obtained.   The patient was hepariniz me in person regarding the patient's condition and the conduct of the operation.     Dictated By Carolina Christian MD  d: 05/04/2018 16:27:31  t: 05/04/2018 16:52:55  [facility] [id number]  /

## 2018-05-07 NOTE — PHYSICAL THERAPY NOTE
PHYSICAL THERAPY TREATMENT NOTE - INPATIENT    Room Number: 8274/8046-F     Session: 1   Number of Visits to Meet Established Goals: 5     History related to current admission: Pt admitted 5/2/18 for c/o heartburn. Found to have NSTEMI.  Underwent emergent from a bed to a chair (including a wheelchair)?: A Little   -   Need to walk in hospital room?: A Little   -   Climbing 3-5 steps with a railing?: A Little       AM-PAC Score:  Raw Score: 18   PT Approx Degree of Impairment Score: 46.58%   Standardized Sco with mobility skills. DISCHARGE RECOMMENDATIONS  PT Discharge Recommendations: Home with home health PT     PLAN  PT Treatment Plan: Bed mobility; Endurance; Patient education;Gait training;Strengthening;Stair training;Transfer training;Balance training

## 2018-05-07 NOTE — PROGRESS NOTES
[facility]  Progress Note    Erick Dodson Patient Status:  Inpatient    1960 MRN [id number]   [facility] 6NE-A Attending Valentino Papas, *   Hosp Day # 5 PCP Sarah Moreno MD     Subjective:  Seen and examined by

## 2018-05-07 NOTE — CM/SW NOTE
05/07/18 1200   CM/SW Referral Data   Referral Source Physician   Reason for Referral Discharge planning   Informant Patient   Social History   Recreational Drug/Alcohol Use no   Major Changes Last 6 Months no   Domestic/Partner Violence no   Suicidal I

## 2018-05-07 NOTE — OCCUPATIONAL THERAPY NOTE
OCCUPATIONAL THERAPY TREATMENT NOTE - INPATIENT     Room Number: 1720/1392-W  Session: 1   Number of Visits to Meet Established Goals: 5    Presenting Problem: s/p CABG on 5/5/18     History related to current admission: admitted on 5/2/2018 from home and completion of mobility and self-care. Pt completed LE dressing, doffing/donning socks. Encouraged pt to incorporate breathing techniques during self-care. Multiple breaks needed. Mobility in the room and freeman with RW completed.   Consistent reminders to supervision  Patient will transfer to toilet:  with supervision      Additional Goals:  Pt will report compliance with all sternal precautions and will incorporate them into his ADL tasks. Pt will report compliance with cardiac binder UE exercises.

## 2018-05-07 NOTE — PLAN OF CARE
CARDIOVASCULAR - ADULT    • Maintains optimal cardiac output and hemodynamic stability Progressing    • Absence of cardiac arrhythmias or at baseline Progressing        Assumed care of pt at 1930. Lung sounds clear and diminished, on 2 L n/c overnight.  Pt

## 2018-05-07 NOTE — PROGRESS NOTES
[facility] Hospitalist Progress Note     PCP: Martin Flores MD    CC:  Follow up    SUBJECTIVE:  Pt sitting up in chair. No complaints. No sob/n/v/f/c currently.  +u, +flatus    OBJECTIVE:  Temp:  [98 °F (36.7 °C)-98.6 °F (37 °C)] 98.2 °F (36.8 °C)  Pu furosemide  20 mg Intravenous BID (Diuretic)   • metoprolol tartrate  50 mg Oral 2x Daily(Beta Blocker)   • ipratropium-albuterol  3 mL Nebulization QID   • enoxaparin  40 mg Subcutaneous Daily   • Insulin Aspart Pen  1-20 Units Subcutaneous TID CC   • Ins protocol    Tachycardia-suspect secondary to pain, monitor.  Resolved    Hypernatremia- mild, monitor     Tob use- pt has anxiolytics prn ordered, when ok with cards, could consider nicotine patch     SCDs, ppx anticoagulation when ok with CV surgery  PT re

## 2018-05-07 NOTE — OCCUPATIONAL THERAPY NOTE
OCCUPATIONAL THERAPY EVALUATION - INPATIENT     Room Number: 1304/1048-E  Evaluation Date: 5/6/2018  Type of Evaluation: Initial  Presenting Problem: s/p CABG on 5/5/18     Physician Order: IP Consult to Occupational Therapy  Reason for Therapy: ADL/IADL D within functional limits     COORDINATION  Gross Motor    [facility]    Fine Motor    [facility]      ADDITIONAL TESTS                                    NEUROLOGICAL FINDINGS  Neurological Findings: None                ACTIVITY TOLERANCE   O2 Saturation at rest 90% and admitted on 5/2/2018, now s/p CABG on 5/5/18. Complete medical history and occupational profile noted above. Functional outcome measures completed include AM-PAC.  In this OT evaluation patient presents with the following performance deficits: pain, BADL/IA transfer from bed to chair:  with supervision  Patient will transfer from supine to sit:  with supervision  Patient will transfer from sit to stand:  with supervision  Patient will transfer to toilet:  with supervision     Additional Goals:  Pt will report

## 2018-05-08 PROCEDURE — 97116 GAIT TRAINING THERAPY: CPT

## 2018-05-08 PROCEDURE — 94640 AIRWAY INHALATION TREATMENT: CPT

## 2018-05-08 PROCEDURE — 85027 COMPLETE CBC AUTOMATED: CPT | Performed by: HOSPITALIST

## 2018-05-08 PROCEDURE — 97110 THERAPEUTIC EXERCISES: CPT

## 2018-05-08 PROCEDURE — 82962 GLUCOSE BLOOD TEST: CPT

## 2018-05-08 PROCEDURE — 97530 THERAPEUTIC ACTIVITIES: CPT

## 2018-05-08 PROCEDURE — 97535 SELF CARE MNGMENT TRAINING: CPT

## 2018-05-08 PROCEDURE — 80048 BASIC METABOLIC PNL TOTAL CA: CPT | Performed by: HOSPITALIST

## 2018-05-08 PROCEDURE — 83735 ASSAY OF MAGNESIUM: CPT | Performed by: HOSPITALIST

## 2018-05-08 RX ORDER — ECHINACEA PURPUREA EXTRACT 125 MG
1 TABLET ORAL
Status: DISCONTINUED | OUTPATIENT
Start: 2018-05-08 | End: 2018-05-09

## 2018-05-08 NOTE — PROGRESS NOTES
[facility] Cardiology Progress Note - Vineet Bacon Patient Status:  Inpatient    1960 MRN [id number]   [facility] 8NE-A Attending Bob Mojica, *   Hosp Day # 6 PCP Cyndee Biswas MD     Subjective: motor or coordinational deficit. Skin: Warm and dry.      Laboratory/Data:    Labs:         Recent Labs   Lab  05/06/18   0451  05/07/18   0431  05/08/18   0420   WBC  18.7*  16.4*  12.7   HGB  9.4*  9.3*  9.4*   MCV  92.4  92.9  94.5   PLT  204.0  210.0 Q30 Min PRN   propofol (DIPRIVAN) infusion 5-100 mcg/kg/min Intravenous Continuous   ipratropium-albuterol (DUONEB) nebulizer solution 3 mL 3 mL Nebulization Q4H PRN   0.9%  NaCl infusion  Intravenous Continuous   HYDROcodone-acetaminophen (Ly Rodriguez)  M PRN   Magnesium Sulfate in D5W IVPB premix 1 g 1 g Intravenous PRN   Magnesium Sulfate IVPB premix SOLN 2 g 2 g Intravenous PRN   mupirocin (BACTROBAN) 2% nasal ointment OINT 1 Application 1 Application Nasal BID   Insulin Regular Human (NOVOLIN R) 100 Uni

## 2018-05-08 NOTE — PLAN OF CARE
Walked in the freeman with walker  Claims he gets more pain when he moves around  Felt tired after walking 350 feet, denies shortness of breath

## 2018-05-08 NOTE — PHYSICAL THERAPY NOTE
PHYSICAL THERAPY TREATMENT NOTE - INPATIENT    Room Number: 2697/2348-P     Session: 2  Number of Visits to Meet Established Goals: 5     History related to current admission: Pt admitted 5/2/18 for c/o heartburn. Found to have NSTEMI.  Underwent emergent hospital room?: A Little   -   Climbing 3-5 steps with a railing?: A Little       AM-PAC Score:  Raw Score: 18   PT Approx Degree of Impairment Score: 46.58%   Standardized Score (AM-PAC Scale): 43.63   CMS Modifier (G-Code): CK    FUNCTIONAL ABILITY STATU education;Gait training;Strengthening;Stair training;Transfer training;Balance training  Rehab Potential : Good  Frequency (Obs): Daily    CURRENT GOALS      Goal #1 Patient is able to demonstrate supine - sit EOB @ level: supervision with HOB flat      Go

## 2018-05-08 NOTE — PROGRESS NOTES
[facility]  Progress Note    Gideon Morales Patient Status:  Inpatient    1960 MRN [id number]   [facility] 6NE-A Attending Matilda Peace, *   Hosp Day # 6 PCP Kaitlin Linda MD     Subjective:  Pt feeling well today.  Pa today and D/C PW tomorrow morning.

## 2018-05-08 NOTE — OCCUPATIONAL THERAPY NOTE
OCCUPATIONAL THERAPY TREATMENT NOTE - INPATIENT     Room Number: 8100/2595-T  Session: 2   Number of Visits to Meet Established Goals: 5    Presenting Problem: s/p CABG on 5/5/18     History related to current admission: admitted on 5/2/2018 from home and self-care. Pt educated on information from the binder to include: energy conservation, HEP, ADLs, and AD. Pt participated in CV Binder Education with good tolerance. Therex as listed below. Mobility as listed above.        Exercises Repetitions   UPPER E supervision     Functional Transfer Goals  Patient will transfer from bed to chair:  with supervision  Patient will transfer from supine to sit:  with supervision  Patient will transfer from sit to stand:  with supervision  Patient will transfer to toilet:

## 2018-05-08 NOTE — PROGRESS NOTES
[facility] Hospitalist Progress Note     PCP: Erica Vaca MD    CC:  Follow up    SUBJECTIVE:  Transferred to CTU. Pt sitting up in chair. Pain controlled. No sob/n/v/f/c currently. +u, +flatus    OBJECTIVE:  Temp:  [97.8 °F (36.6 °C)-98.9 °F (37. 3 mL Nebulization QID   • aspirin EC  325 mg Oral Daily   • docusate sodium  100 mg Oral BID    Or   • docusate sodium  100 mg Oral BID   • famoTIDine  20 mg Oral BID    Or   • famoTIDine  20 mg Intravenous BID   • mupirocin  1 Application Nasal BID   • at

## 2018-05-08 NOTE — PLAN OF CARE
CARDIOVASCULAR - ADULT    • Maintains optimal cardiac output and hemodynamic stability Progressing    • Absence of cardiac arrhythmias or at baseline Progressing        Assumed care of pt at 1930.  Pt ambulated in hallway with front wheel walker, tolerated

## 2018-05-08 NOTE — PROGRESS NOTES
ENDOCRINOLOGY PROGRESS NOTE    Typed by Yecenia Lu MD on 5/8/2018      S:  Transferred to stepdown floor today. Sitting up in the chair eating lunch. Feels well. Wife at bedside. O: /73 (BP Location: Left arm)   Pulse 96   Temp 97. 5.3%      · Since the glucoses are stable, I will discontinue the Accucheks and the Novolog coverage  · Pt's PCP is Dr. Lauro Loo        2. CABG x 3 (5/4/2018): POD #4  3. Htn  4. Dyslipidemia      · Continue statin, beta blocker and aspirin.        I

## 2018-05-08 NOTE — CM/SW NOTE
Family c unable to accept--re referred to [facility] to check if [address]

## 2018-05-09 VITALS
OXYGEN SATURATION: 95 % | WEIGHT: 198.88 LBS | RESPIRATION RATE: 18 BRPM | DIASTOLIC BLOOD PRESSURE: 74 MMHG | TEMPERATURE: 98 F | SYSTOLIC BLOOD PRESSURE: 116 MMHG | HEART RATE: 113 BPM

## 2018-05-09 PROCEDURE — 94640 AIRWAY INHALATION TREATMENT: CPT

## 2018-05-09 PROCEDURE — 83735 ASSAY OF MAGNESIUM: CPT | Performed by: HOSPITALIST

## 2018-05-09 PROCEDURE — 84132 ASSAY OF SERUM POTASSIUM: CPT | Performed by: HOSPITALIST

## 2018-05-09 PROCEDURE — 97116 GAIT TRAINING THERAPY: CPT

## 2018-05-09 PROCEDURE — 97110 THERAPEUTIC EXERCISES: CPT

## 2018-05-09 RX ORDER — HYDROCODONE BITARTRATE AND ACETAMINOPHEN 10; 325 MG/1; MG/1
1-2 TABLET ORAL EVERY 4 HOURS PRN
Qty: 30 TABLET | Refills: 0 | Status: SHIPPED | OUTPATIENT
Start: 2018-05-09 | End: 2018-06-01 | Stop reason: ALTCHOICE

## 2018-05-09 RX ORDER — BENZONATATE 100 MG/1
100 CAPSULE ORAL 3 TIMES DAILY PRN
Qty: 20 CAPSULE | Refills: 0 | Status: SHIPPED | OUTPATIENT
Start: 2018-05-09 | End: 2018-05-18

## 2018-05-09 RX ORDER — METOPROLOL TARTRATE 50 MG/1
50 TABLET, FILM COATED ORAL
Qty: 30 TABLET | Refills: 6 | Status: SHIPPED | OUTPATIENT
Start: 2018-05-09 | End: 2018-05-18

## 2018-05-09 RX ORDER — PSEUDOEPHEDRINE HCL 30 MG
100 TABLET ORAL 2 TIMES DAILY PRN
Qty: 30 CAPSULE | Refills: 0 | Status: SHIPPED | OUTPATIENT
Start: 2018-05-09 | End: 2018-06-11

## 2018-05-09 RX ORDER — BENZONATATE 200 MG/1
200 CAPSULE ORAL EVERY 12 HOURS PRN
Status: DISCONTINUED | OUTPATIENT
Start: 2018-05-09 | End: 2018-05-09

## 2018-05-09 RX ORDER — ATORVASTATIN CALCIUM 40 MG/1
40 TABLET, FILM COATED ORAL NIGHTLY
Qty: 30 TABLET | Refills: 6 | Status: SHIPPED | OUTPATIENT
Start: 2018-05-09 | End: 2018-06-09

## 2018-05-09 NOTE — DISCHARGE SUMMARY
General Medicine Discharge Summary     Patient ID:  Erick Dodson  62year old  [id number]  11/4/1960    Admit date: 5/2/2018    Discharge date and time: 5/9/18    Attending Physician: Valentino Papas, *     Primary Care Physician: Vitor Ordoñez CONSULT TO PREVENTION & EDUCATION  IP CONSULT TO SOCIAL WORK  IP CONSULT TO RESPIRATORY CARE  IP CONSULT TO POST OPEN HEART SURGERY DIABETES SPECIALIST TEAM  IP CONSULT TO ENDOCRINOLOGY  IP CONSULT TO SOCIAL WORK  IP CONSULT TO PULMONOLOGY    Radiology:  U Left CCA Peak Systolic Velocity:  82 cm/s             Left Proximal ICA Peak Systolic Velocity:  08.81 cm/s             Left Mid ICA Peak Systolic Velocity:  52.82 cm/s             Left Distal ICA Peak Systolic Velocity:  34.22 cm/s Endotracheal tube tip lies in the mid trachea in good position. The nasogastric tube tip is below the level of the film and below the diaphragm. Right IJ [place] catheter tip is in the distal main right pulmonary artery.   Mediastinal drainage catheter Current Discharge Medication List    START taking these medications    HYDROcodone-acetaminophen  MG Oral Tab  Take 1-2 tablets by mouth every 4 (four) hours as needed.     docusate sodium 100 MG Oral Cap  Take 100 mg by mouth 2 (two) times daily as Coordinating Care: Greater than 30 minutes    Patient had opportunity to ask questions and state understand and agree with therapeutic plan as outlined    Thank You,  Marc Castillo MD    [facility] Hospitalist  Pager [phone]

## 2018-05-09 NOTE — PROGRESS NOTES
[facility] Hospitalist Progress Note     PCP: Karen Lopez MD    CC:  Follow up    SUBJECTIVE:  Pt sitting up in chair, trying to nap. Urinating more with diuresis. Pain controlled. Gets some sob if \"really\" exerting himself walking.     OBJECT Daily(Beta Blocker)   • ipratropium-albuterol  3 mL Nebulization QID   • aspirin EC  325 mg Oral Daily   • docusate sodium  100 mg Oral BID    Or   • docusate sodium  100 mg Oral BID   • famoTIDine  20 mg Oral BID    Or   • famoTIDine  20 mg Intravenous BI

## 2018-05-09 NOTE — CARDIAC REHAB
CAD education completed. Offered phase 2 Cardiac Rehab whether ward's [facility] or [address] [place] [address] states he lives 20-30 miles from both [facility] and [place]. Pt did not want me to make his appt at this time.  Unsure where he will do rehab, nick

## 2018-05-09 NOTE — PHYSICAL THERAPY NOTE
PHYSICAL THERAPY TREATMENT NOTE - INPATIENT    Room Number: 3915/8582-C     Session: 3  Number of Visits to Meet Established Goals: 5     History related to current admission: Pt admitted 5/2/18 for c/o heartburn. Found to have NSTEMI.  Underwent emergent -   Moving from lying on back to sitting on the side of the bed?: None   How much help from another person does the patient currently need. ..   -   Moving to and from a bed to a chair (including a wheelchair)?: None   -   Need to walk in hospital room?: address above mentioned impairments and functional mobility deficits in order to return to independent prior level of function.      DISCHARGE RECOMMENDATIONS  PT Discharge Recommendations: Home with home health PT     PLAN  PT Treatment Plan: Bed mobility;

## 2018-05-09 NOTE — PROGRESS NOTES
[facility] Cardiology Progress Note - Teri Bacon Patient Status:  Inpatient    1960 MRN [id number]   [facility] 8NE-A Attending Robert Gama, *   Hosp Day # 7 PCP Neptali Traore MD     Subjective: 9. 3*  9.4*   MCV  92.9  94.5   PLT  210.0  219.0       Recent Labs   Lab  05/07/18   0431  05/08/18   0420  05/09/18   0514   NA  141  140   --    K  4.6  3.9  4.0   CL  109  107   --    CO2  28.0  27.0   --    BUN  29*  27*   --    CREATSERUM  1.08  0.89 2 mg 2 mg Intravenous Q1H PRN   Or      morphINE sulfate (PF) 4 MG/ML injection 4 mg 4 mg Intravenous Q1H PRN   Or      morphINE sulfate (PF) 4 MG/ML injection 8 mg 8 mg Intravenous Q1H PRN   temazepam (RESTORIL) cap 15 mg 15 mg Oral Nightly PRN   nitroGLY bleeding  Endocrine: no history of diabetes  Allergy: see above drug allergies    Chandrakant Riley MD  5/9/2018  2:49 PM

## 2018-05-09 NOTE — PLAN OF CARE
Received bedside report on this Pt. At 1530. Pt. Awake, A&Ox4, cooperative, SR/ST on Tele monitor, sats greater than 92% on RA, denied any pain or distress. Pt. Up ad starr in room, went to BR, reported having 2 BMs, soft.   Received discharge orders and not

## 2018-05-09 NOTE — PROGRESS NOTES
Met with patient to discuss discharge instructions, activity restrictions and recommendations, follow up visits, all questions answered, encouraged to call with any questions or concerns.   Discussed smoking cessation, offered to discuss chantix with Dr. Juana Nguyen

## 2018-05-09 NOTE — CONSULTS
Pulmonary / Critical Care H&P/Consult       NAME: Juan Manuel Lopez - ROOM: 69/0357-N - MRN: [id number] - Age: 62year old - :  1960    Date of Admission: 2018  8:50 PM  Admission Diagnosis: MI  elevated troponin  CAD    Reason for consult: jamel for constipation.  Disp: 30 capsule Rfl: 0       Scheduled Medication:  • furosemide  20 mg Intravenous BID (Diuretic)   • metoprolol tartrate  50 mg Oral 2x Daily(Beta Blocker)   • ipratropium-albuterol  3 mL Nebulization QID   • aspirin EC  325 mg Oral Da 198 lb 13.7 oz (90.2 kg)   SpO2 95%     General Appearance:    Alert, cooperative, no distress, appears stated age   Head:    Normocephalic, without obvious abnormality, atraumatic   Eyes:    PERRL, conjunctiva/corneas clear   Neck:   Supple, symmetrical, continue smoking cessation efforts. 3. dispo: no objection to d/c. Should f/u with us in clinic in 1 month with a cxr.              Padilla Boyer M.D.  [facility] Pulmonary / [address]  5/9/2018

## 2018-05-09 NOTE — PROGRESS NOTES
[facility]  Progress Note    [address]d [address] Patient Status:  Inpatient    1960 MRN [id number]   [facility] 8NE-A Attending Reynaldo Sy, *   Hosp Day # 7 PCP Lars Costa MD     Subjective:  Patient seen by Dr. Reema Cortez.

## 2018-05-09 NOTE — PLAN OF CARE
METABOLIC/FLUID AND ELECTROLYTES - ADULT    • Glucose maintained within prescribed range Completed          CARDIOVASCULAR - ADULT    • Maintains optimal cardiac output and hemodynamic stability Progressing    • Absence of cardiac arrhythmias or at baselin

## 2018-05-14 NOTE — PROCEDURES
[address]    PATIENT'S NAME: Puneet ANGELES   ATTENDING PHYSICIAN: Elie Burrows. Ester Treadwell MD   OPERATING PHYSICIAN: Roseanna Galvez M.D.    PATIENT ACCOUNT#:   [de-identified]    LOCATION:  [address] [place] [address]  MEDICAL RECORD #:   [id number]       DATE OF BIRTH: 30-degree WILSON angle using 40 ml of nonionic contrast dye injected over a 4-second period. At the conclusion of the LV gram, again hemodynamic pressure measurements were obtained, and a pressure recording was performed during an aortic valve pullback.   Aft surgery. PROCEDURE:  After signed consent, the right groin 6-Luxembourgish introducer was exchanged for a 7-Luxembourgish and afterwards an intra-aortic balloon pump was placed in the descending aorta over the small J-wire.   The device was taped to the leg, covered w

## 2018-05-17 ENCOUNTER — HOSPITAL ENCOUNTER (OUTPATIENT)
Dept: GENERAL RADIOLOGY | Facility: HOSPITAL | Age: 58
Discharge: HOME | End: 2018-05-17
Attending: THORACIC SURGERY (CARDIOTHORACIC VASCULAR SURGERY)
Payer: COMMERCIAL

## 2018-05-17 ENCOUNTER — TELEPHONE (OUTPATIENT)
Dept: CARDIOLOGY UNIT | Facility: HOSPITAL | Age: 58
End: 2018-05-17

## 2018-05-17 DIAGNOSIS — Z98.890 HISTORY OF OPEN HEART SURGERY: ICD-10-CM

## 2018-05-17 PROBLEM — I10 HYPERTENSION, UNSPECIFIED TYPE: Status: ACTIVE | Noted: 2018-05-17

## 2018-05-17 PROBLEM — I25.2 H/O NON-ST ELEVATION MYOCARDIAL INFARCTION (NSTEMI): Status: ACTIVE | Noted: 2018-05-17

## 2018-05-17 PROCEDURE — 71048 X-RAY EXAM CHEST 4+ VIEWS: CPT | Performed by: THORACIC SURGERY (CARDIOTHORACIC VASCULAR SURGERY)

## 2018-05-17 NOTE — PROGRESS NOTES
Pt requesting norco refill, norco 5/325mg #30 refilled per Dr. Nadine Beckford, instructed pt to attempt to alternate tylenol with norco in an attempt to wean norco. He understands and agrees. Reviewed cxr, no effusions noted, pt denies sob, no follow up indicated.

## 2018-05-18 ENCOUNTER — OFFICE VISIT (OUTPATIENT)
Dept: FAMILY MEDICINE CLINIC | Facility: CLINIC | Age: 58
End: 2018-05-18

## 2018-05-18 VITALS
DIASTOLIC BLOOD PRESSURE: 66 MMHG | SYSTOLIC BLOOD PRESSURE: 112 MMHG | BODY MASS INDEX: 30 KG/M2 | OXYGEN SATURATION: 99 % | HEART RATE: 76 BPM | WEIGHT: 189.5 LBS | TEMPERATURE: 99 F

## 2018-05-18 DIAGNOSIS — Z95.1 S/P CABG X 3: Primary | ICD-10-CM

## 2018-05-18 DIAGNOSIS — I10 HYPERTENSION, UNSPECIFIED TYPE: ICD-10-CM

## 2018-05-18 DIAGNOSIS — I25.2 H/O NON-ST ELEVATION MYOCARDIAL INFARCTION (NSTEMI): ICD-10-CM

## 2018-05-18 PROCEDURE — 99204 OFFICE O/P NEW MOD 45 MIN: CPT | Performed by: FAMILY MEDICINE

## 2018-05-18 RX ORDER — METOPROLOL TARTRATE 50 MG/1
50 TABLET, FILM COATED ORAL
Qty: 60 TABLET | Status: SHIPPED | OUTPATIENT
Start: 2018-05-18 | End: 2018-12-14

## 2018-05-18 RX ORDER — BENZONATATE 100 MG/1
100 CAPSULE ORAL 3 TIMES DAILY PRN
Qty: 20 CAPSULE | Refills: 0 | Status: SHIPPED | OUTPATIENT
Start: 2018-05-18 | End: 2018-06-11

## 2018-05-18 RX ORDER — AMOXICILLIN 875 MG/1
875 TABLET, COATED ORAL 2 TIMES DAILY
Qty: 20 TABLET | Refills: 0 | Status: SHIPPED | OUTPATIENT
Start: 2018-05-18 | End: 2018-05-28

## 2018-05-18 NOTE — PROGRESS NOTES
Erika Cartwright is a 62year old male. Patient presents with: Other: est care. . room 2      HPI:   Patient underwent a three-vessel bypass to [address] by Dr. Izabella Cardenas. He initially presented to [facility].  He was anticoagulated and transferred to [facility] Smoker                                                              Packs/day: 0.50      Years: 35.00        Quit date: 5/3/2018  Smokeless tobacco: Never Used                      Alcohol use: Yes           4.2 oz/week     Cans of beer: 7 per week       R 3 (three) times daily as needed for cough. amoxicillin 875 MG Oral Tab 20 tablet 0      Sig: Take 1 tablet (875 mg total) by mouth 2 (two) times daily.            Imaging & Consults:  CARDIO - INTERNAL

## 2018-05-24 ENCOUNTER — PRIOR ORIGINAL RECORDS (OUTPATIENT)
Dept: OTHER | Age: 58
End: 2018-05-24

## 2018-06-01 ENCOUNTER — PRIOR ORIGINAL RECORDS (OUTPATIENT)
Dept: OTHER | Age: 58
End: 2018-06-01

## 2018-06-01 ENCOUNTER — OFFICE VISIT (OUTPATIENT)
Dept: FAMILY MEDICINE CLINIC | Facility: CLINIC | Age: 58
End: 2018-06-01

## 2018-06-01 VITALS
DIASTOLIC BLOOD PRESSURE: 70 MMHG | BODY MASS INDEX: 30 KG/M2 | WEIGHT: 189 LBS | SYSTOLIC BLOOD PRESSURE: 112 MMHG | TEMPERATURE: 99 F

## 2018-06-01 DIAGNOSIS — Z95.1 S/P CABG X 3: ICD-10-CM

## 2018-06-01 DIAGNOSIS — I25.2 H/O NON-ST ELEVATION MYOCARDIAL INFARCTION (NSTEMI): Primary | ICD-10-CM

## 2018-06-01 PROCEDURE — 99214 OFFICE O/P EST MOD 30 MIN: CPT | Performed by: FAMILY MEDICINE

## 2018-06-01 NOTE — PROGRESS NOTES
Melinda Augustine is a 62year old male. Patient presents with: Other: follow up. . room 1      HPI:   Doing well post CABG. Released by Dr Allyssa Ledbetter. Going to have EST and start rehab next week. No longer taking any norco.  He did have one nosebleed.   He has al any unusual skin lesions or rashes  RESPIRATORY: denies shortness of breath   CARDIOVASCULAR: denies chest pain   GI: denies nausea, vomiting, diarrhea or abdominal pain   NEURO: denies headaches    EXAM:   /70   Temp 98.8 °F (37.1 °C) (Temporal)   W

## 2018-06-06 ENCOUNTER — HOSPITAL ENCOUNTER (OUTPATIENT)
Dept: CV DIAGNOSTICS | Facility: HOSPITAL | Age: 58
Discharge: HOME | End: 2018-06-06
Attending: PHYSICIAN ASSISTANT
Payer: COMMERCIAL

## 2018-06-06 ENCOUNTER — CARDPULM VISIT (OUTPATIENT)
Dept: CARDIAC REHAB | Facility: HOSPITAL | Age: 58
End: 2018-06-06
Attending: INTERNAL MEDICINE
Payer: COMMERCIAL

## 2018-06-06 VITALS
HEART RATE: 71 BPM | BODY MASS INDEX: 29.98 KG/M2 | SYSTOLIC BLOOD PRESSURE: 130 MMHG | OXYGEN SATURATION: 98 % | WEIGHT: 191 LBS | HEIGHT: 67 IN | DIASTOLIC BLOOD PRESSURE: 84 MMHG

## 2018-06-06 DIAGNOSIS — Z95.1 S/P CABG X 3: ICD-10-CM

## 2018-06-06 DIAGNOSIS — I25.2 H/O NON-ST ELEVATION MYOCARDIAL INFARCTION (NSTEMI): ICD-10-CM

## 2018-06-06 DIAGNOSIS — F17.200 TOBACCO USE DISORDER: ICD-10-CM

## 2018-06-06 DIAGNOSIS — Z09 HOSPITAL DISCHARGE FOLLOW-UP: ICD-10-CM

## 2018-06-06 DIAGNOSIS — I10 HYPERTENSION, UNSPECIFIED TYPE: ICD-10-CM

## 2018-06-06 PROCEDURE — 93798 PHYS/QHP OP CAR RHAB W/ECG: CPT

## 2018-06-06 PROCEDURE — 93017 CV STRESS TEST TRACING ONLY: CPT | Performed by: PHYSICIAN ASSISTANT

## 2018-06-06 PROCEDURE — 93018 CV STRESS TEST I&R ONLY: CPT | Performed by: PHYSICIAN ASSISTANT

## 2018-06-09 ENCOUNTER — TELEPHONE (OUTPATIENT)
Dept: FAMILY MEDICINE CLINIC | Facility: CLINIC | Age: 58
End: 2018-06-09

## 2018-06-09 RX ORDER — ATORVASTATIN CALCIUM 40 MG/1
40 TABLET, FILM COATED ORAL NIGHTLY
Qty: 30 TABLET | Refills: 5 | Status: SHIPPED | OUTPATIENT
Start: 2018-06-09 | End: 2018-12-01

## 2018-06-11 PROBLEM — E78.00 HYPERCHOLESTEREMIA: Status: ACTIVE | Noted: 2018-06-11

## 2018-06-13 ENCOUNTER — APPOINTMENT (OUTPATIENT)
Dept: CARDIAC REHAB | Age: 58
End: 2018-06-13
Attending: INTERNAL MEDICINE
Payer: COMMERCIAL

## 2018-06-15 ENCOUNTER — APPOINTMENT (OUTPATIENT)
Dept: CARDIAC REHAB | Age: 58
End: 2018-06-15
Attending: INTERNAL MEDICINE
Payer: COMMERCIAL

## 2018-06-15 PROCEDURE — 93798 PHYS/QHP OP CAR RHAB W/ECG: CPT

## 2018-06-18 ENCOUNTER — CARDPULM VISIT (OUTPATIENT)
Dept: CARDIAC REHAB | Age: 58
End: 2018-06-18
Attending: INTERNAL MEDICINE
Payer: COMMERCIAL

## 2018-06-18 PROCEDURE — 93798 PHYS/QHP OP CAR RHAB W/ECG: CPT

## 2018-06-20 ENCOUNTER — CARDPULM VISIT (OUTPATIENT)
Dept: CARDIAC REHAB | Age: 58
End: 2018-06-20
Attending: INTERNAL MEDICINE
Payer: COMMERCIAL

## 2018-06-20 PROCEDURE — 93798 PHYS/QHP OP CAR RHAB W/ECG: CPT

## 2018-06-22 ENCOUNTER — TELEPHONE (OUTPATIENT)
Dept: FAMILY MEDICINE CLINIC | Facility: CLINIC | Age: 58
End: 2018-06-22

## 2018-06-22 ENCOUNTER — CARDPULM VISIT (OUTPATIENT)
Dept: CARDIAC REHAB | Age: 58
End: 2018-06-22
Attending: INTERNAL MEDICINE
Payer: COMMERCIAL

## 2018-06-22 PROCEDURE — 93798 PHYS/QHP OP CAR RHAB W/ECG: CPT

## 2018-06-22 NOTE — TELEPHONE ENCOUNTER
Ipratropium-Albuterol (COMBIVENT RESPIMAT)  MCG/ACT Inhalation Aero Soln  PLEASE SEND REFILL TO [facility] IN [place]

## 2018-06-22 NOTE — TELEPHONE ENCOUNTER
Confirmed with the patient that the patient does take the Combivent 2 puffs @6 hours (COPD)    The wife did also mention that regular albuterol is acceptable as well depending on what the insurance covers       Called the pharmacy and Maddie Carpio is on break

## 2018-06-22 NOTE — TELEPHONE ENCOUNTER
Calling Taisha- I confirmed that he is taking the 2 puffs Q6 hours   Their system is stopping her from filling it because the max puffs is 6/24hr period.      I did advise that is how it has been prescribed previously and the patient confirmed that is how

## 2018-06-25 ENCOUNTER — CARDPULM VISIT (OUTPATIENT)
Dept: CARDIAC REHAB | Age: 58
End: 2018-06-25
Attending: INTERNAL MEDICINE
Payer: COMMERCIAL

## 2018-06-25 PROCEDURE — 93798 PHYS/QHP OP CAR RHAB W/ECG: CPT

## 2018-06-27 ENCOUNTER — CARDPULM VISIT (OUTPATIENT)
Dept: CARDIAC REHAB | Age: 58
End: 2018-06-27
Attending: INTERNAL MEDICINE
Payer: COMMERCIAL

## 2018-06-27 PROCEDURE — 93798 PHYS/QHP OP CAR RHAB W/ECG: CPT

## 2018-06-29 ENCOUNTER — CARDPULM VISIT (OUTPATIENT)
Dept: CARDIAC REHAB | Age: 58
End: 2018-06-29
Attending: INTERNAL MEDICINE
Payer: COMMERCIAL

## 2018-06-29 PROCEDURE — 93798 PHYS/QHP OP CAR RHAB W/ECG: CPT

## 2018-07-02 ENCOUNTER — TELEPHONE (OUTPATIENT)
Dept: FAMILY MEDICINE CLINIC | Facility: CLINIC | Age: 58
End: 2018-07-02

## 2018-07-02 ENCOUNTER — CARDPULM VISIT (OUTPATIENT)
Dept: CARDIAC REHAB | Age: 58
End: 2018-07-02
Attending: INTERNAL MEDICINE
Payer: COMMERCIAL

## 2018-07-02 PROCEDURE — 93798 PHYS/QHP OP CAR RHAB W/ECG: CPT

## 2018-07-02 NOTE — TELEPHONE ENCOUNTER
Calling carmelita,     Future Appointments  Date Time Provider Derek Osman   7/3/2018 3:30 PM Jeffery Smith DO [facility] [place]   7/6/2018 9:00 AM Y CARDIAC REHAB ROOM 1  M[facility] [place]   7/9/2018 9:00 AM Y CARDIAC REHAB ROOM [facility] [place]   9/10/2018 9:00 AM [facility] CARDIAC REHAB ROOM 1 [facility]   9/14/2018 10:00 AM MD KOBY Banegas

## 2018-07-02 NOTE — TELEPHONE ENCOUNTER
Pt was seen on 8/1,  States he still has an infection in his mouth,  Was prescribed an antibiotic but probably needs a stronger one, Talked to his wife Quin Sotelo and states Pt doesn't want to make another appt to come in since he was just seen.    Wants to

## 2018-07-03 ENCOUNTER — OFFICE VISIT (OUTPATIENT)
Dept: FAMILY MEDICINE CLINIC | Facility: CLINIC | Age: 58
End: 2018-07-03

## 2018-07-03 VITALS
TEMPERATURE: 98 F | BODY MASS INDEX: 29.51 KG/M2 | WEIGHT: 199.25 LBS | SYSTOLIC BLOOD PRESSURE: 124 MMHG | HEIGHT: 68.75 IN | DIASTOLIC BLOOD PRESSURE: 70 MMHG

## 2018-07-03 DIAGNOSIS — K04.7 ABSCESSED TOOTH: Primary | ICD-10-CM

## 2018-07-03 PROCEDURE — 99214 OFFICE O/P EST MOD 30 MIN: CPT | Performed by: FAMILY MEDICINE

## 2018-07-03 RX ORDER — CLINDAMYCIN HYDROCHLORIDE 300 MG/1
300 CAPSULE ORAL 3 TIMES DAILY
Qty: 30 CAPSULE | Refills: 0 | Status: SHIPPED | OUTPATIENT
Start: 2018-07-03 | End: 2018-09-12 | Stop reason: ALTCHOICE

## 2018-07-03 NOTE — PROGRESS NOTES
Darius Sutherland is a 62year old male. Patient presents with: Other: mouth infection  ~ inrm . 2      HPI:   Patient is to have all his teeth extracted with phase 1 starting 8/1. He has recurrent abscesses.  Was prescribed amoxicillin in May but didn't fee vomiting, diarrhea or abdominal pain   NEURO: denies headaches    EXAM:   /70 (BP Location: Right arm, Patient Position: Sitting, Cuff Size: large)   Temp 98 °F (36.7 °C) (Temporal)   Ht 68.75\"   Wt 199 lb 4 oz   BMI 29.64 kg/m²   GENERAL: well deve

## 2018-07-06 ENCOUNTER — CARDPULM VISIT (OUTPATIENT)
Dept: CARDIAC REHAB | Age: 58
End: 2018-07-06
Attending: INTERNAL MEDICINE
Payer: COMMERCIAL

## 2018-07-06 PROCEDURE — 93798 PHYS/QHP OP CAR RHAB W/ECG: CPT

## 2018-07-09 ENCOUNTER — CARDPULM VISIT (OUTPATIENT)
Dept: CARDIAC REHAB | Age: 58
End: 2018-07-09
Attending: INTERNAL MEDICINE
Payer: COMMERCIAL

## 2018-07-09 PROCEDURE — 93798 PHYS/QHP OP CAR RHAB W/ECG: CPT

## 2018-07-11 ENCOUNTER — CARDPULM VISIT (OUTPATIENT)
Dept: CARDIAC REHAB | Age: 58
End: 2018-07-11
Attending: INTERNAL MEDICINE
Payer: COMMERCIAL

## 2018-07-11 PROCEDURE — 93798 PHYS/QHP OP CAR RHAB W/ECG: CPT

## 2018-07-13 ENCOUNTER — CARDPULM VISIT (OUTPATIENT)
Dept: CARDIAC REHAB | Age: 58
End: 2018-07-13
Attending: INTERNAL MEDICINE
Payer: COMMERCIAL

## 2018-07-13 PROCEDURE — 93798 PHYS/QHP OP CAR RHAB W/ECG: CPT

## 2018-07-16 ENCOUNTER — CARDPULM VISIT (OUTPATIENT)
Dept: CARDIAC REHAB | Age: 58
End: 2018-07-16
Attending: INTERNAL MEDICINE
Payer: COMMERCIAL

## 2018-07-16 PROCEDURE — 93798 PHYS/QHP OP CAR RHAB W/ECG: CPT

## 2018-07-18 ENCOUNTER — CARDPULM VISIT (OUTPATIENT)
Dept: CARDIAC REHAB | Age: 58
End: 2018-07-18
Attending: INTERNAL MEDICINE
Payer: COMMERCIAL

## 2018-07-18 PROCEDURE — 93798 PHYS/QHP OP CAR RHAB W/ECG: CPT

## 2018-07-20 ENCOUNTER — PRIOR ORIGINAL RECORDS (OUTPATIENT)
Dept: OTHER | Age: 58
End: 2018-07-20

## 2018-07-20 ENCOUNTER — CARDPULM VISIT (OUTPATIENT)
Dept: CARDIAC REHAB | Age: 58
End: 2018-07-20
Attending: INTERNAL MEDICINE
Payer: COMMERCIAL

## 2018-07-20 PROCEDURE — 93798 PHYS/QHP OP CAR RHAB W/ECG: CPT

## 2018-07-23 ENCOUNTER — TELEPHONE (OUTPATIENT)
Dept: FAMILY MEDICINE CLINIC | Facility: CLINIC | Age: 58
End: 2018-07-23

## 2018-07-23 ENCOUNTER — CARDPULM VISIT (OUTPATIENT)
Dept: CARDIAC REHAB | Age: 58
End: 2018-07-23
Attending: INTERNAL MEDICINE
Payer: COMMERCIAL

## 2018-07-23 ENCOUNTER — MED REC SCAN ONLY (OUTPATIENT)
Dept: FAMILY MEDICINE CLINIC | Facility: CLINIC | Age: 58
End: 2018-07-23

## 2018-07-23 PROCEDURE — 93798 PHYS/QHP OP CAR RHAB W/ECG: CPT

## 2018-07-23 RX ORDER — ALBUTEROL SULFATE 90 UG/1
2 AEROSOL, METERED RESPIRATORY (INHALATION) 4 TIMES DAILY
Qty: 1 INHALER | Refills: 0 | Status: SHIPPED | OUTPATIENT
Start: 2018-07-23 | End: 2018-09-17

## 2018-07-23 NOTE — TELEPHONE ENCOUNTER
Calling the patient- Dr Mcwilliams Presume is filling out the disability paperwork and has some questions- mltcb     Is he back to work? If not, when is he going back? Did the surgeon or cardiologist limit his activity?

## 2018-07-23 NOTE — TELEPHONE ENCOUNTER
Calling the patient- the paperwork is complete. confirming if he is picking up? He is not sure how his employer wants to handle this so he is checking into this and will let me know.      I will hold the original paperwork and send the copies to be scanne

## 2018-07-23 NOTE — TELEPHONE ENCOUNTER
The patient called back and advised that he has not returned to work and he is hopefully going back at the beginning of September 2018.  He will be having his cardiac surgery, but also needs to have some dental work as well so he is doing everything at the

## 2018-07-23 NOTE — TELEPHONE ENCOUNTER
Ipratropium-Albuterol (COMBIVENT RESPIMAT)  MCG/ACT Inhalation Aero Soln   IS THERE SOMETHING CHEAPER, IT IS A BIT EXPENSIVE EVEN WITH INSURANCE?

## 2018-07-24 ENCOUNTER — TELEPHONE (OUTPATIENT)
Dept: FAMILY MEDICINE CLINIC | Facility: CLINIC | Age: 58
End: 2018-07-24

## 2018-07-24 ENCOUNTER — PRIOR ORIGINAL RECORDS (OUTPATIENT)
Dept: OTHER | Age: 58
End: 2018-07-24

## 2018-07-24 NOTE — TELEPHONE ENCOUNTER
Calling Damaris Jacob-     She advised that there is a problem regarding the Aspirin and his dental surgery that is scheduled next week. Dr Kota Rolon is comfortable with the patient being off of the Asprin for 7 full days but Dr Rich Vergara says only 3 days.

## 2018-07-24 NOTE — TELEPHONE ENCOUNTER
Need referral faxed to  [facility] @ Dr Renee Frey office cant see the referral so can this be faxed to  [phone].

## 2018-07-24 NOTE — TELEPHONE ENCOUNTER
[facility] ADVISED THAT DR Essie Vasquez- THE ORAL SURGEON- HAS AGREED TO ALLOW THE PATIENT TO BE OFF OF THE ASPIRIN 3 DAYS PRIOR TO THE SURGERY. HE WILL ONLY PULL THE BAD TEETH INITIALLY AND THEN DECIDE FROM THERE.    PATIENT WILL D/C THE AS

## 2018-07-24 NOTE — TELEPHONE ENCOUNTER
Calling to advise- she figured that he would not be able to intervene, but she still wanted us to know what is going on. She advised that the oral surgeon and Dr Yanci Mendez have talked several times and neither wants to budge.

## 2018-07-25 ENCOUNTER — CARDPULM VISIT (OUTPATIENT)
Dept: CARDIAC REHAB | Age: 58
End: 2018-07-25
Attending: INTERNAL MEDICINE
Payer: COMMERCIAL

## 2018-07-25 PROCEDURE — 93798 PHYS/QHP OP CAR RHAB W/ECG: CPT

## 2018-07-26 ENCOUNTER — TELEPHONE (OUTPATIENT)
Dept: FAMILY MEDICINE CLINIC | Facility: CLINIC | Age: 58
End: 2018-07-26

## 2018-07-26 NOTE — TELEPHONE ENCOUNTER
Calling Theo Virk- the Ida Fuentes isn't helping him at all. He is taking 2 tablets of the Norco 10-325mg   She thought that Dr Britany Garcia had filled it last time for him?      I advised that Dr Britany Garcia has not filled the Laneta Pickle  I asked if she has called the
Calling carmelita back- advised
HE IS IN A LOT OF PAIN WITH HIS TOOTH AND WANTS TO KNOW IF HE CAN HAVE SOMETHING STRONGER THAN Sang Coffey
This is completely a dental issue. BS \"go through your primary. \"  This dentist or oral surgeon  need to deal with it.
Applied

## 2018-07-30 ENCOUNTER — CARDPULM VISIT (OUTPATIENT)
Dept: CARDIAC REHAB | Age: 58
End: 2018-07-30
Attending: INTERNAL MEDICINE
Payer: COMMERCIAL

## 2018-07-30 PROCEDURE — 93798 PHYS/QHP OP CAR RHAB W/ECG: CPT

## 2018-08-03 ENCOUNTER — CARDPULM VISIT (OUTPATIENT)
Dept: CARDIAC REHAB | Age: 58
End: 2018-08-03
Attending: INTERNAL MEDICINE
Payer: COMMERCIAL

## 2018-08-06 ENCOUNTER — CARDPULM VISIT (OUTPATIENT)
Dept: CARDIAC REHAB | Age: 58
End: 2018-08-06
Attending: INTERNAL MEDICINE
Payer: COMMERCIAL

## 2018-08-06 PROCEDURE — 93798 PHYS/QHP OP CAR RHAB W/ECG: CPT

## 2018-08-07 ENCOUNTER — TELEPHONE (OUTPATIENT)
Dept: FAMILY MEDICINE CLINIC | Facility: CLINIC | Age: 58
End: 2018-08-07

## 2018-08-07 NOTE — TELEPHONE ENCOUNTER
Calling the patient  He is going to stop in and  the disability paperwork that  filled out.    I will place at the

## 2018-08-08 ENCOUNTER — CARDPULM VISIT (OUTPATIENT)
Dept: CARDIAC REHAB | Age: 58
End: 2018-08-08
Attending: INTERNAL MEDICINE
Payer: COMMERCIAL

## 2018-08-08 PROCEDURE — 93798 PHYS/QHP OP CAR RHAB W/ECG: CPT

## 2018-08-10 ENCOUNTER — CARDPULM VISIT (OUTPATIENT)
Dept: CARDIAC REHAB | Age: 58
End: 2018-08-10
Attending: INTERNAL MEDICINE
Payer: COMMERCIAL

## 2018-08-10 PROCEDURE — 93798 PHYS/QHP OP CAR RHAB W/ECG: CPT

## 2018-08-13 ENCOUNTER — CARDPULM VISIT (OUTPATIENT)
Dept: CARDIAC REHAB | Age: 58
End: 2018-08-13
Attending: INTERNAL MEDICINE
Payer: COMMERCIAL

## 2018-08-13 PROCEDURE — 93798 PHYS/QHP OP CAR RHAB W/ECG: CPT

## 2018-08-15 ENCOUNTER — CARDPULM VISIT (OUTPATIENT)
Dept: CARDIAC REHAB | Age: 58
End: 2018-08-15
Attending: INTERNAL MEDICINE
Payer: COMMERCIAL

## 2018-08-15 PROCEDURE — 93798 PHYS/QHP OP CAR RHAB W/ECG: CPT

## 2018-08-17 ENCOUNTER — CARDPULM VISIT (OUTPATIENT)
Dept: CARDIAC REHAB | Age: 58
End: 2018-08-17
Attending: INTERNAL MEDICINE
Payer: COMMERCIAL

## 2018-08-17 PROCEDURE — 93798 PHYS/QHP OP CAR RHAB W/ECG: CPT

## 2018-08-20 ENCOUNTER — CARDPULM VISIT (OUTPATIENT)
Dept: CARDIAC REHAB | Age: 58
End: 2018-08-20
Attending: INTERNAL MEDICINE
Payer: COMMERCIAL

## 2018-08-20 PROCEDURE — 93798 PHYS/QHP OP CAR RHAB W/ECG: CPT

## 2018-08-22 ENCOUNTER — CARDPULM VISIT (OUTPATIENT)
Dept: CARDIAC REHAB | Age: 58
End: 2018-08-22
Attending: INTERNAL MEDICINE
Payer: COMMERCIAL

## 2018-08-22 PROCEDURE — 93798 PHYS/QHP OP CAR RHAB W/ECG: CPT

## 2018-08-24 ENCOUNTER — CARDPULM VISIT (OUTPATIENT)
Dept: CARDIAC REHAB | Age: 58
End: 2018-08-24
Attending: INTERNAL MEDICINE
Payer: COMMERCIAL

## 2018-08-24 PROCEDURE — 93798 PHYS/QHP OP CAR RHAB W/ECG: CPT

## 2018-08-27 ENCOUNTER — CARDPULM VISIT (OUTPATIENT)
Dept: CARDIAC REHAB | Age: 58
End: 2018-08-27
Attending: INTERNAL MEDICINE
Payer: COMMERCIAL

## 2018-08-27 PROCEDURE — 93798 PHYS/QHP OP CAR RHAB W/ECG: CPT

## 2018-08-29 ENCOUNTER — CARDPULM VISIT (OUTPATIENT)
Dept: CARDIAC REHAB | Age: 58
End: 2018-08-29
Attending: INTERNAL MEDICINE
Payer: COMMERCIAL

## 2018-08-29 PROCEDURE — 93798 PHYS/QHP OP CAR RHAB W/ECG: CPT

## 2018-08-31 ENCOUNTER — CARDPULM VISIT (OUTPATIENT)
Dept: CARDIAC REHAB | Age: 58
End: 2018-08-31
Attending: INTERNAL MEDICINE
Payer: COMMERCIAL

## 2018-08-31 PROCEDURE — 93798 PHYS/QHP OP CAR RHAB W/ECG: CPT

## 2018-09-05 ENCOUNTER — CARDPULM VISIT (OUTPATIENT)
Dept: CARDIAC REHAB | Age: 58
End: 2018-09-05
Attending: INTERNAL MEDICINE
Payer: COMMERCIAL

## 2018-09-05 PROCEDURE — 93798 PHYS/QHP OP CAR RHAB W/ECG: CPT

## 2018-09-07 ENCOUNTER — CARDPULM VISIT (OUTPATIENT)
Dept: CARDIAC REHAB | Age: 58
End: 2018-09-07
Attending: INTERNAL MEDICINE
Payer: COMMERCIAL

## 2018-09-07 PROCEDURE — 93798 PHYS/QHP OP CAR RHAB W/ECG: CPT

## 2018-09-10 ENCOUNTER — CARDPULM VISIT (OUTPATIENT)
Dept: CARDIAC REHAB | Age: 58
End: 2018-09-10
Attending: INTERNAL MEDICINE
Payer: COMMERCIAL

## 2018-09-10 PROCEDURE — 93798 PHYS/QHP OP CAR RHAB W/ECG: CPT

## 2018-09-12 ENCOUNTER — OFFICE VISIT (OUTPATIENT)
Dept: FAMILY MEDICINE CLINIC | Facility: CLINIC | Age: 58
End: 2018-09-12

## 2018-09-12 ENCOUNTER — CARDPULM VISIT (OUTPATIENT)
Dept: CARDIAC REHAB | Age: 58
End: 2018-09-12
Attending: INTERNAL MEDICINE
Payer: COMMERCIAL

## 2018-09-12 ENCOUNTER — PRIOR ORIGINAL RECORDS (OUTPATIENT)
Dept: OTHER | Age: 58
End: 2018-09-12

## 2018-09-12 ENCOUNTER — APPOINTMENT (OUTPATIENT)
Dept: LAB | Age: 58
End: 2018-09-12
Attending: FAMILY MEDICINE
Payer: COMMERCIAL

## 2018-09-12 VITALS
OXYGEN SATURATION: 98 % | TEMPERATURE: 98 F | WEIGHT: 203.38 LBS | BODY MASS INDEX: 30 KG/M2 | HEART RATE: 52 BPM | DIASTOLIC BLOOD PRESSURE: 84 MMHG | SYSTOLIC BLOOD PRESSURE: 130 MMHG

## 2018-09-12 DIAGNOSIS — Z95.1 S/P CABG X 3: ICD-10-CM

## 2018-09-12 DIAGNOSIS — R73.9 HYPERGLYCEMIA: ICD-10-CM

## 2018-09-12 DIAGNOSIS — I25.10 CORONARY ARTERY DISEASE INVOLVING NATIVE CORONARY ARTERY OF NATIVE HEART WITHOUT ANGINA PECTORIS: ICD-10-CM

## 2018-09-12 DIAGNOSIS — Z12.5 PROSTATE CANCER SCREENING: ICD-10-CM

## 2018-09-12 DIAGNOSIS — I10 HYPERTENSION, UNSPECIFIED TYPE: Primary | ICD-10-CM

## 2018-09-12 DIAGNOSIS — E78.00 HYPERCHOLESTEREMIA: ICD-10-CM

## 2018-09-12 DIAGNOSIS — I25.2 H/O NON-ST ELEVATION MYOCARDIAL INFARCTION (NSTEMI): ICD-10-CM

## 2018-09-12 DIAGNOSIS — I10 HYPERTENSION, UNSPECIFIED TYPE: ICD-10-CM

## 2018-09-12 LAB
ALBUMIN SERPL-MCNC: 3.9 G/DL (ref 3.5–4.8)
ALBUMIN/GLOB SERPL: 1 {RATIO} (ref 1–2)
ALP LIVER SERPL-CCNC: 81 U/L (ref 45–117)
ALT SERPL-CCNC: 33 U/L (ref 17–63)
ANION GAP SERPL CALC-SCNC: 5 MMOL/L (ref 0–18)
AST SERPL-CCNC: 30 U/L (ref 15–41)
BILIRUB SERPL-MCNC: 1 MG/DL (ref 0.1–2)
BUN BLD-MCNC: 16 MG/DL (ref 8–20)
BUN/CREAT SERPL: 14.2 (ref 10–20)
CALCIUM BLD-MCNC: 9 MG/DL (ref 8.3–10.3)
CHLORIDE SERPL-SCNC: 105 MMOL/L (ref 101–111)
CHOLEST SMN-MCNC: 151 MG/DL (ref ?–200)
CO2 SERPL-SCNC: 27 MMOL/L (ref 22–32)
COMPLEXED PSA SERPL-MCNC: 0.84 NG/ML (ref 0.01–4)
CREAT BLD-MCNC: 1.13 MG/DL (ref 0.7–1.3)
EST. AVERAGE GLUCOSE BLD GHB EST-MCNC: 120 MG/DL (ref 68–126)
GLOBULIN PLAS-MCNC: 3.8 G/DL (ref 2.5–4)
GLUCOSE BLD-MCNC: 88 MG/DL (ref 70–99)
HBA1C MFR BLD HPLC: 5.8 % (ref ?–5.7)
HDLC SERPL-MCNC: 36 MG/DL (ref 40–59)
LDLC SERPL CALC-MCNC: 77 MG/DL (ref ?–100)
M PROTEIN MFR SERPL ELPH: 7.7 G/DL (ref 6.1–8.3)
NONHDLC SERPL-MCNC: 115 MG/DL (ref ?–130)
OSMOLALITY SERPL CALC.SUM OF ELEC: 285 MOSM/KG (ref 275–295)
POTASSIUM SERPL-SCNC: 4.8 MMOL/L (ref 3.6–5.1)
SODIUM SERPL-SCNC: 137 MMOL/L (ref 136–144)
TRIGL SERPL-MCNC: 191 MG/DL (ref 30–149)
VLDLC SERPL CALC-MCNC: 38 MG/DL (ref 0–30)

## 2018-09-12 PROCEDURE — 80061 LIPID PANEL: CPT

## 2018-09-12 PROCEDURE — 80053 COMPREHEN METABOLIC PANEL: CPT

## 2018-09-12 PROCEDURE — 83036 HEMOGLOBIN GLYCOSYLATED A1C: CPT

## 2018-09-12 PROCEDURE — 99214 OFFICE O/P EST MOD 30 MIN: CPT | Performed by: FAMILY MEDICINE

## 2018-09-12 PROCEDURE — 36415 COLL VENOUS BLD VENIPUNCTURE: CPT

## 2018-09-12 PROCEDURE — 90670 PCV13 VACCINE IM: CPT | Performed by: FAMILY MEDICINE

## 2018-09-12 PROCEDURE — 90471 IMMUNIZATION ADMIN: CPT | Performed by: FAMILY MEDICINE

## 2018-09-12 PROCEDURE — 36415 COLL VENOUS BLD VENIPUNCTURE: CPT | Performed by: FAMILY MEDICINE

## 2018-09-12 PROCEDURE — 93798 PHYS/QHP OP CAR RHAB W/ECG: CPT

## 2018-09-12 NOTE — PROGRESS NOTES
Blayne Segura is a 62year old male. Patient presents with: Other: fasting labs, check up. ...room 1      HPI:   Mowing grass, cutting wood, feeling well. States he has much more energy than prior to surgery.     Current Outpatient Medications on File Yumiko 98%   BMI 30.25 kg/m²   GENERAL: well developed, well nourished,in no apparent distress  SKIN: no rashes,no suspicious lesions  HEENT: atraumatic, normocephalic, R TM normal, L TM normal, Pharynx normal  NECK: supple, no cervical adenopathy  LUNGS: clear t

## 2018-12-03 RX ORDER — ATORVASTATIN CALCIUM 40 MG/1
TABLET, FILM COATED ORAL
Qty: 30 TABLET | Refills: 3 | Status: SHIPPED | OUTPATIENT
Start: 2018-12-03 | End: 2018-12-14

## 2018-12-03 NOTE — TELEPHONE ENCOUNTER
Last office visit:  09/12/18  Last refill:  06/09/18   #30, 5 refills   Last lipid:  09/12/18         No future appointments.

## 2018-12-14 ENCOUNTER — TELEPHONE (OUTPATIENT)
Dept: FAMILY MEDICINE CLINIC | Facility: CLINIC | Age: 58
End: 2018-12-14

## 2018-12-14 ENCOUNTER — MYAURORA ACCOUNT LINK (OUTPATIENT)
Dept: OTHER | Age: 58
End: 2018-12-14

## 2018-12-14 ENCOUNTER — PRIOR ORIGINAL RECORDS (OUTPATIENT)
Dept: OTHER | Age: 58
End: 2018-12-14

## 2018-12-14 RX ORDER — METOPROLOL TARTRATE 50 MG/1
50 TABLET, FILM COATED ORAL
Qty: 90 TABLET | Refills: 3 | Status: SHIPPED | OUTPATIENT
Start: 2018-12-14 | End: 2018-12-14

## 2018-12-14 RX ORDER — ATORVASTATIN CALCIUM 40 MG/1
TABLET, FILM COATED ORAL
Qty: 90 TABLET | Refills: 3 | Status: SHIPPED | OUTPATIENT
Start: 2018-12-14 | End: 2020-01-03

## 2018-12-14 RX ORDER — METOPROLOL TARTRATE 50 MG/1
50 TABLET, FILM COATED ORAL
Qty: 180 TABLET | Refills: 3 | Status: SHIPPED | OUTPATIENT
Start: 2018-12-14 | End: 2019-12-18

## 2018-12-14 NOTE — TELEPHONE ENCOUNTER
REFILL ATORVASTATIN, LOPRESSOR  FOR 90 DAYS WITH 3 REFILLS  ( PLEASE CANCEL ADDITIONAL REFILLS CURRENTLY AT PHARMACY)    SEND TO [facility] IN [place].

## 2019-01-03 LAB
ALBUMIN: 3.9 G/DL
ALKALINE PHOSPHATATE(ALK PHOS): 81 IU/L
BILIRUBIN TOTAL: 1 MG/DL
BUN: 16 MG/DL
CALCIUM: 9 MG/DL
CHLORIDE: 105 MEQ/L
CHOLESTEROL, TOTAL: 151 MG/DL
CREATININE, SERUM: 1.13 MG/DL
GLOBULIN: 3.8 G/DL
GLUCOSE: 88 MG/DL
HDL CHOLESTEROL: 36 MG/DL
HEMOGLOBIN A1C: 5.8 %
LDL CHOLESTEROL: 77 MG/DL
POTASSIUM, SERUM: 4.8 MEQ/L
PROTEIN, TOTAL: 7.7 G/DL
SGOT (AST): 30 IU/L
SGPT (ALT): 33 IU/L
SODIUM: 137 MEQ/L
TRIGLYCERIDES: 191 MG/DL

## 2019-01-11 ENCOUNTER — MED REC SCAN ONLY (OUTPATIENT)
Dept: FAMILY MEDICINE CLINIC | Facility: CLINIC | Age: 59
End: 2019-01-11

## 2019-02-28 VITALS
SYSTOLIC BLOOD PRESSURE: 114 MMHG | WEIGHT: 206 LBS | OXYGEN SATURATION: 99 % | DIASTOLIC BLOOD PRESSURE: 80 MMHG | HEART RATE: 65 BPM

## 2019-02-28 VITALS
DIASTOLIC BLOOD PRESSURE: 70 MMHG | HEART RATE: 64 BPM | HEIGHT: 69 IN | SYSTOLIC BLOOD PRESSURE: 122 MMHG | WEIGHT: 201 LBS | BODY MASS INDEX: 29.77 KG/M2

## 2019-03-12 ENCOUNTER — PATIENT OUTREACH (OUTPATIENT)
Dept: FAMILY MEDICINE CLINIC | Facility: CLINIC | Age: 59
End: 2019-03-12

## 2019-03-19 ENCOUNTER — TELEPHONE (OUTPATIENT)
Dept: CARDIOLOGY | Age: 59
End: 2019-03-19

## 2019-03-19 ENCOUNTER — TELEPHONE (OUTPATIENT)
Dept: FAMILY MEDICINE CLINIC | Facility: CLINIC | Age: 59
End: 2019-03-19

## 2019-03-19 NOTE — TELEPHONE ENCOUNTER
ELENO HAD A PHYSICAL YESTERDAY (3/18/19) AND HIS BP WAS REALLY HIGH 180/110. HE HAD IT FOR HIS EMPLOYMENT. THEY NEED HIM TO COME IN FOR BP CHECKS FOR 3 DAYS STRAIGHT. DOES  NEED TO SEE HIM FOR ONE OF THOSE VISITS?   PLEASE ADVISE

## 2019-03-19 NOTE — TELEPHONE ENCOUNTER
Will please call the patient and schedule him to see Dr Nellie Aragon first? Then we will schedule Rn appointments if needed. Thank you!

## 2019-03-20 ENCOUNTER — OFFICE VISIT (OUTPATIENT)
Dept: FAMILY MEDICINE CLINIC | Facility: CLINIC | Age: 59
End: 2019-03-20

## 2019-03-20 ENCOUNTER — TELEPHONE (OUTPATIENT)
Dept: FAMILY MEDICINE CLINIC | Facility: CLINIC | Age: 59
End: 2019-03-20

## 2019-03-20 VITALS
OXYGEN SATURATION: 99 % | WEIGHT: 212.38 LBS | BODY MASS INDEX: 31.45 KG/M2 | DIASTOLIC BLOOD PRESSURE: 88 MMHG | HEIGHT: 68.75 IN | HEART RATE: 60 BPM | TEMPERATURE: 97 F | SYSTOLIC BLOOD PRESSURE: 130 MMHG

## 2019-03-20 DIAGNOSIS — R73.9 HYPERGLYCEMIA: ICD-10-CM

## 2019-03-20 DIAGNOSIS — F41.9 ANXIETY: ICD-10-CM

## 2019-03-20 DIAGNOSIS — Z95.1 S/P CABG X 3: ICD-10-CM

## 2019-03-20 DIAGNOSIS — I10 HYPERTENSION, UNSPECIFIED TYPE: Primary | ICD-10-CM

## 2019-03-20 PROCEDURE — 96127 BRIEF EMOTIONAL/BEHAV ASSMT: CPT | Performed by: FAMILY MEDICINE

## 2019-03-20 PROCEDURE — 99213 OFFICE O/P EST LOW 20 MIN: CPT | Performed by: FAMILY MEDICINE

## 2019-03-20 RX ORDER — CITALOPRAM 20 MG/1
20 TABLET ORAL EVERY MORNING
Qty: 90 TABLET | Refills: 3 | Status: SHIPPED | OUTPATIENT
Start: 2019-03-20 | End: 2019-03-20

## 2019-03-20 RX ORDER — CITALOPRAM 20 MG/1
20 TABLET ORAL EVERY MORNING
Qty: 90 TABLET | Refills: 3 | Status: SHIPPED | OUTPATIENT
Start: 2019-03-20 | End: 2020-09-17

## 2019-03-20 NOTE — PROGRESS NOTES
Laquita Meyer is a 62year old male. Patient presents with: Other: BP check-no insurance, does need colon screening. ...room 2      HPI:   Patient was seen at the emergency room with a panic attack. At that time his blood pressure was 180/110.   Somehow t use: Yes      Alcohol/week: 4.2 oz      Types: 7 Cans of beer per week    Drug use: No       REVIEW OF SYSTEMS:   GENERAL HEALTH: feels well otherwise  SKIN: denies any unusual skin lesions or rashes  RESPIRATORY: denies shortness of breath   CARDIOVASCULA placed in this encounter.       Meds & Refills for this Visit:  Requested Prescriptions      No prescriptions requested or ordered in this encounter       Imaging & Consults:  None

## 2019-03-21 ENCOUNTER — NURSE ONLY (OUTPATIENT)
Dept: FAMILY MEDICINE CLINIC | Facility: CLINIC | Age: 59
End: 2019-03-21

## 2019-03-21 RX ORDER — LISINOPRIL AND HYDROCHLOROTHIAZIDE 12.5; 1 MG/1; MG/1
1 TABLET ORAL EVERY MORNING
Qty: 30 TABLET | Refills: 0 | Status: SHIPPED | OUTPATIENT
Start: 2019-03-21 | End: 2019-04-20 | Stop reason: ALTCHOICE

## 2019-03-21 NOTE — PROGRESS NOTES
While in the office patient blood pressure was elevated (210/110, 174/108, 188/110). Pt needs 3 documented blood pressures (on separate days) below 180 to start a new job. He is very anxious while in the office.  Discussed with Dr. Vandana Stanton and received VO

## 2019-03-22 ENCOUNTER — OFFICE VISIT (OUTPATIENT)
Dept: FAMILY MEDICINE CLINIC | Facility: CLINIC | Age: 59
End: 2019-03-22

## 2019-03-22 VITALS
TEMPERATURE: 98 F | DIASTOLIC BLOOD PRESSURE: 90 MMHG | BODY MASS INDEX: 32 KG/M2 | HEART RATE: 60 BPM | SYSTOLIC BLOOD PRESSURE: 148 MMHG | WEIGHT: 213 LBS | OXYGEN SATURATION: 98 %

## 2019-03-22 VITALS — HEART RATE: 68 BPM | SYSTOLIC BLOOD PRESSURE: 154 MMHG | DIASTOLIC BLOOD PRESSURE: 90 MMHG

## 2019-03-22 DIAGNOSIS — I10 HYPERTENSION, UNSPECIFIED TYPE: Primary | ICD-10-CM

## 2019-03-22 NOTE — PROGRESS NOTES
Erick Dodson is a 62year old male. Patient presents with: Other: blood pressure elevated bp readings at home; pt reports being under a lot of stress. . room 1      HPI:   Russell Mixon is here to recheck BP after starting lisinopril/ hctz. Feeling fine.     Cu Years since quittin.8      Smokeless tobacco: Never Used    Alcohol use: Yes      Alcohol/week: 4.2 oz      Types: 7 Cans of beer per week    Drug use:  No             REVIEW OF SYSTEMS:   GENERAL HEALTH: feels well otherwise  SKIN: denies any unusual s

## 2019-03-27 ENCOUNTER — TELEPHONE (OUTPATIENT)
Dept: FAMILY MEDICINE CLINIC | Facility: CLINIC | Age: 59
End: 2019-03-27

## 2019-03-27 NOTE — TELEPHONE ENCOUNTER
----- Message from Keyon Loyola sent at 3/27/2019 12:15 PM CDT -----  Contact: MAYTE CALLE @ [phone] ABOUT SCHEDULING BP CHECK?

## 2019-03-27 NOTE — TELEPHONE ENCOUNTER
Called the wife and she advised that her 's employer is still holding his job for him, but he needs two separate BP readings and they need to be under the 140/90     He started BP meds 1 week ago so he needs to come in to have it checked     Future

## 2019-03-28 ENCOUNTER — NURSE ONLY (OUTPATIENT)
Dept: FAMILY MEDICINE CLINIC | Facility: CLINIC | Age: 59
End: 2019-03-28

## 2019-03-28 VITALS — DIASTOLIC BLOOD PRESSURE: 92 MMHG | HEART RATE: 60 BPM | SYSTOLIC BLOOD PRESSURE: 128 MMHG

## 2019-03-29 ENCOUNTER — NURSE ONLY (OUTPATIENT)
Dept: FAMILY MEDICINE CLINIC | Facility: CLINIC | Age: 59
End: 2019-03-29

## 2019-03-29 VITALS — SYSTOLIC BLOOD PRESSURE: 136 MMHG | DIASTOLIC BLOOD PRESSURE: 82 MMHG

## 2019-03-29 DIAGNOSIS — I10 ESSENTIAL HYPERTENSION, BENIGN: Primary | ICD-10-CM

## 2019-04-20 NOTE — PROGRESS NOTES
Linda Sanchez is a 62year old male. Patient presents with: Follow - Up: 1 month follow up. . med side effect during the night. . room 1      HPI:   Patient was started on citalopram.  He is feeling much better.   He is no longer feeling like his mood is skin lesions or rashes  RESPIRATORY: denies shortness of breath   CARDIOVASCULAR: denies chest pain   GI: denies nausea, vomiting, diarrhea or abdominal pain   NEURO: denies headaches    EXAM:   /70   Pulse 56   Temp 98 °F (36.7 °C) (Temporal)   Wt 2

## 2019-09-05 ENCOUNTER — TELEPHONE (OUTPATIENT)
Dept: FAMILY MEDICINE CLINIC | Facility: CLINIC | Age: 59
End: 2019-09-05

## 2019-09-05 NOTE — TELEPHONE ENCOUNTER
Call placed to patient to recommend patient obtain and complete a FIT Colorectal Screening test. Message left for patient to call back, awaiting response.

## 2019-10-18 ENCOUNTER — TELEPHONE (OUTPATIENT)
Dept: FAMILY MEDICINE CLINIC | Facility: CLINIC | Age: 59
End: 2019-10-18

## 2019-10-30 ENCOUNTER — TELEPHONE (OUTPATIENT)
Dept: FAMILY MEDICINE CLINIC | Facility: CLINIC | Age: 59
End: 2019-10-30

## 2019-10-30 NOTE — TELEPHONE ENCOUNTER
SHE CALLED BACK ON 10-18-19 AND NEVER RECEIVED A CALL BACK.  SHE WILL BE FAXING SOME INFO OVER AS WELL, PLEASE CALL

## 2019-12-12 ENCOUNTER — TELEPHONE (OUTPATIENT)
Dept: CARDIOLOGY | Age: 59
End: 2019-12-12

## 2019-12-12 DIAGNOSIS — I25.10 ATHEROSCLEROSIS OF NATIVE CORONARY ARTERY WITHOUT ANGINA PECTORIS, UNSPECIFIED WHETHER NATIVE OR TRANSPLANTED HEART: Primary | ICD-10-CM

## 2019-12-12 DIAGNOSIS — I10 HYPERTENSION, UNSPECIFIED TYPE: ICD-10-CM

## 2019-12-12 DIAGNOSIS — I10 HTN (HYPERTENSION), BENIGN: ICD-10-CM

## 2019-12-14 ENCOUNTER — TELEPHONE (OUTPATIENT)
Dept: FAMILY MEDICINE CLINIC | Facility: CLINIC | Age: 59
End: 2019-12-14

## 2019-12-14 NOTE — TELEPHONE ENCOUNTER
See below. .. FYI    Letter was sent to the patient in October about coming in for an office visit and fasting labs.

## 2019-12-14 NOTE — TELEPHONE ENCOUNTER
----- Message from Earlene Mon sent at 12/14/2019 10:24 AM CST -----  Christian Peacock called back. Azucena Treadwell got a dui and Brianna cannot drive because the effects of some new medicine she is taking.

## 2019-12-18 ENCOUNTER — TELEPHONE (OUTPATIENT)
Dept: CARDIOLOGY | Age: 59
End: 2019-12-18

## 2019-12-18 DIAGNOSIS — I25.10 ATHEROSCLEROSIS OF NATIVE CORONARY ARTERY WITHOUT ANGINA PECTORIS, UNSPECIFIED WHETHER NATIVE OR TRANSPLANTED HEART: Primary | ICD-10-CM

## 2019-12-19 RX ORDER — METOPROLOL TARTRATE 50 MG/1
TABLET, FILM COATED ORAL
Qty: 180 TABLET | Refills: 0 | Status: SHIPPED | OUTPATIENT
Start: 2019-12-19 | End: 2020-03-16

## 2019-12-19 NOTE — TELEPHONE ENCOUNTER
Last refill x 1 year on 12/14/18  Last office visit on 4/20/19  /  Future Appointments   Date Time Provider Derek Osman   12/20/2019 11:15 AM DO LUPIS WaltonSW [facility] [place]     BP Readings from Last 3 Encounters:  04/20/19 : 130/70  03/29/

## 2019-12-20 ENCOUNTER — APPOINTMENT (OUTPATIENT)
Dept: LAB | Age: 59
End: 2019-12-20
Attending: FAMILY MEDICINE
Payer: COMMERCIAL

## 2019-12-20 ENCOUNTER — OFFICE VISIT (OUTPATIENT)
Dept: FAMILY MEDICINE CLINIC | Facility: CLINIC | Age: 59
End: 2019-12-20
Payer: COMMERCIAL

## 2019-12-20 VITALS
BODY MASS INDEX: 32.92 KG/M2 | DIASTOLIC BLOOD PRESSURE: 80 MMHG | TEMPERATURE: 97 F | HEIGHT: 68 IN | HEART RATE: 54 BPM | SYSTOLIC BLOOD PRESSURE: 138 MMHG | RESPIRATION RATE: 18 BRPM | WEIGHT: 217.25 LBS | OXYGEN SATURATION: 98 %

## 2019-12-20 DIAGNOSIS — Z00.00 PREVENTATIVE HEALTH CARE: Primary | ICD-10-CM

## 2019-12-20 DIAGNOSIS — Z95.1 S/P CABG X 3: ICD-10-CM

## 2019-12-20 DIAGNOSIS — R73.9 HYPERGLYCEMIA: ICD-10-CM

## 2019-12-20 DIAGNOSIS — E78.00 HYPERCHOLESTEREMIA: ICD-10-CM

## 2019-12-20 DIAGNOSIS — Z12.5 PROSTATE CANCER SCREENING: ICD-10-CM

## 2019-12-20 DIAGNOSIS — Z23 NEED FOR VACCINATION: ICD-10-CM

## 2019-12-20 DIAGNOSIS — I10 HYPERTENSION, UNSPECIFIED TYPE: ICD-10-CM

## 2019-12-20 DIAGNOSIS — Z12.5 SCREENING FOR PROSTATE CANCER: ICD-10-CM

## 2019-12-20 PROCEDURE — 80053 COMPREHEN METABOLIC PANEL: CPT | Performed by: FAMILY MEDICINE

## 2019-12-20 PROCEDURE — 90686 IIV4 VACC NO PRSV 0.5 ML IM: CPT | Performed by: FAMILY MEDICINE

## 2019-12-20 PROCEDURE — 90471 IMMUNIZATION ADMIN: CPT | Performed by: FAMILY MEDICINE

## 2019-12-20 PROCEDURE — 83036 HEMOGLOBIN GLYCOSYLATED A1C: CPT | Performed by: FAMILY MEDICINE

## 2019-12-20 PROCEDURE — 84153 ASSAY OF PSA TOTAL: CPT | Performed by: FAMILY MEDICINE

## 2019-12-20 PROCEDURE — 99396 PREV VISIT EST AGE 40-64: CPT | Performed by: FAMILY MEDICINE

## 2019-12-20 PROCEDURE — 36415 COLL VENOUS BLD VENIPUNCTURE: CPT | Performed by: FAMILY MEDICINE

## 2019-12-20 PROCEDURE — 80061 LIPID PANEL: CPT | Performed by: FAMILY MEDICINE

## 2019-12-20 NOTE — H&P
Amber Chen   + is a 61year old male who presents for a complete physical exam.     Patient presents with:  CPX: annual physical-had a banana and coffee this am....room 1      HPI:   Patient is generally doing well.   Unfortunately, he got a DUI over t Years: 35.00        Pack years: 16.11        Quit date: 5/3/2018        Years since quittin.6      Smokeless tobacco: Never Used    Alcohol use:  Yes      Alcohol/week: 7.0 standard drinks      Types: 7 Cans of beer per week    Drug use: No        PO sample.    • Cholesterol, Total 09/12/2018 151  <200 mg/dL Final      Desirable  <200 mg/dL  Borderline  200-239 mg/dL  High      >=240 mg/dL       • HDL Cholesterol 09/12/2018 36* 40 - 59 mg/dL Final      Interpretive Information:   An HDL cholesterol <40 Protein 09/12/2018 7.7  6.1 - 8.3 g/dL Final   • Albumin 09/12/2018 3.9  3.5 - 4.8 g/dL Final   • Globulin  09/12/2018 3.8  2.5 - 4.0 g/dL Final   • A/G Ratio 09/12/2018 1.0  1.0 - 2.0 Final   • HgbA1C 09/12/2018 5.8* <5.7 % Final       Normal HbA1C:     <

## 2020-01-03 RX ORDER — ATORVASTATIN CALCIUM 40 MG/1
TABLET, FILM COATED ORAL
Qty: 90 TABLET | Refills: 0 | Status: SHIPPED | OUTPATIENT
Start: 2020-01-03 | End: 2020-03-30

## 2020-01-03 NOTE — TELEPHONE ENCOUNTER
LOV: 12/20/19    LAST LAB: 12/20/19    LAST RX: 12/14/18, 90 tabs, 3 refills    Next OV:   Future Appointments   Date Time Provider Derek Osman   1/15/2020  8:30 AM [facility] TECH 2 [facility] [place]         PROTOCOL    Cholesterol Medication Irene

## 2020-01-29 ENCOUNTER — HOSPITAL ENCOUNTER (OUTPATIENT)
Dept: CV DIAGNOSTICS | Age: 60
Discharge: HOME | End: 2020-01-29
Attending: INTERNAL MEDICINE
Payer: COMMERCIAL

## 2020-01-29 DIAGNOSIS — I10 HTN (HYPERTENSION): ICD-10-CM

## 2020-01-29 DIAGNOSIS — I25.10 ATHEROSCLEROSIS OF NATIVE CORONARY ARTERY WITHOUT ANGINA PECTORIS, UNSPECIFIED WHETHER NATIVE OR TRANSPLANTED HEART: ICD-10-CM

## 2020-01-29 PROCEDURE — 78452 HT MUSCLE IMAGE SPECT MULT: CPT | Performed by: INTERNAL MEDICINE

## 2020-01-29 PROCEDURE — 93017 CV STRESS TEST TRACING ONLY: CPT | Performed by: INTERNAL MEDICINE

## 2020-01-29 PROCEDURE — 93018 CV STRESS TEST I&R ONLY: CPT | Performed by: INTERNAL MEDICINE

## 2020-01-31 ENCOUNTER — TELEPHONE (OUTPATIENT)
Dept: CARDIOLOGY | Age: 60
End: 2020-01-31

## 2020-01-31 DIAGNOSIS — I25.10 ATHEROSCLEROSIS OF NATIVE CORONARY ARTERY WITHOUT ANGINA PECTORIS, UNSPECIFIED WHETHER NATIVE OR TRANSPLANTED HEART: Primary | ICD-10-CM

## 2020-01-31 DIAGNOSIS — I10 HYPERTENSION, UNSPECIFIED TYPE: ICD-10-CM

## 2020-02-07 ENCOUNTER — HOSPITAL ENCOUNTER (OUTPATIENT)
Dept: CV DIAGNOSTICS | Age: 60
Discharge: HOME | End: 2020-02-07
Attending: INTERNAL MEDICINE
Payer: COMMERCIAL

## 2020-02-07 DIAGNOSIS — I10 HYPERTENSION, UNSPECIFIED TYPE: ICD-10-CM

## 2020-02-07 DIAGNOSIS — I25.10 ATHEROSCLEROSIS OF NATIVE CORONARY ARTERY WITHOUT ANGINA PECTORIS, UNSPECIFIED WHETHER NATIVE OR TRANSPLANTED HEART: ICD-10-CM

## 2020-02-07 PROCEDURE — 93306 TTE W/DOPPLER COMPLETE: CPT | Performed by: INTERNAL MEDICINE

## 2020-02-12 ENCOUNTER — TELEPHONE (OUTPATIENT)
Dept: CARDIOLOGY | Age: 60
End: 2020-02-12

## 2020-02-21 ENCOUNTER — OFFICE VISIT (OUTPATIENT)
Dept: CARDIOLOGY | Age: 60
End: 2020-02-21

## 2020-02-21 VITALS
HEART RATE: 60 BPM | BODY MASS INDEX: 31.21 KG/M2 | SYSTOLIC BLOOD PRESSURE: 136 MMHG | DIASTOLIC BLOOD PRESSURE: 80 MMHG | WEIGHT: 218 LBS | HEIGHT: 70 IN

## 2020-02-21 DIAGNOSIS — E78.00 HYPERCHOLESTEREMIA: ICD-10-CM

## 2020-02-21 DIAGNOSIS — I25.2 H/O NON-ST ELEVATION MYOCARDIAL INFARCTION (NSTEMI): ICD-10-CM

## 2020-02-21 DIAGNOSIS — I10 ESSENTIAL HYPERTENSION: ICD-10-CM

## 2020-02-21 DIAGNOSIS — I25.10 CORONARY ARTERY DISEASE INVOLVING NATIVE CORONARY ARTERY OF NATIVE HEART WITHOUT ANGINA PECTORIS: Primary | ICD-10-CM

## 2020-02-21 DIAGNOSIS — Z95.1 S/P CABG X 3: ICD-10-CM

## 2020-02-21 PROCEDURE — 3079F DIAST BP 80-89 MM HG: CPT | Performed by: INTERNAL MEDICINE

## 2020-02-21 PROCEDURE — 99214 OFFICE O/P EST MOD 30 MIN: CPT | Performed by: INTERNAL MEDICINE

## 2020-02-21 PROCEDURE — 3075F SYST BP GE 130 - 139MM HG: CPT | Performed by: INTERNAL MEDICINE

## 2020-02-21 ASSESSMENT — ENCOUNTER SYMPTOMS
CHILLS: 0
HEMOPTYSIS: 0
FEVER: 0
ALLERGIC/IMMUNOLOGIC COMMENTS: NO NEW FOOD ALLERGIES
WEIGHT GAIN: 1
HEMATOCHEZIA: 0
SUSPICIOUS LESIONS: 0
COUGH: 0
BRUISES/BLEEDS EASILY: 0
WEIGHT LOSS: 0

## 2020-03-16 RX ORDER — METOPROLOL TARTRATE 50 MG/1
TABLET, FILM COATED ORAL
Qty: 180 TABLET | Refills: 0 | Status: SHIPPED | OUTPATIENT
Start: 2020-03-16 | End: 2020-06-16

## 2020-03-16 NOTE — TELEPHONE ENCOUNTER
Last refill #180 on 12/19/19  Last office visit on 12/20/19  No future appointments.   Labs current  Refilled per protocol

## 2020-03-30 RX ORDER — ATORVASTATIN CALCIUM 40 MG/1
TABLET, FILM COATED ORAL
Qty: 90 TABLET | Refills: 2 | Status: SHIPPED | OUTPATIENT
Start: 2020-03-30 | End: 2020-12-21

## 2020-03-30 NOTE — TELEPHONE ENCOUNTER
Last refill #90 on 1/3/2020  Last office visit on 12/20/19  No future appointments.   Labs current until 12/2020  Refilled per protocol

## 2020-04-16 RX ORDER — LISINOPRIL 10 MG/1
TABLET ORAL
Qty: 90 TABLET | Refills: 0 | Status: SHIPPED | OUTPATIENT
Start: 2020-04-16 | End: 2020-07-15

## 2020-06-16 RX ORDER — METOPROLOL TARTRATE 50 MG/1
TABLET, FILM COATED ORAL
Qty: 180 TABLET | Refills: 0 | Status: SHIPPED | OUTPATIENT
Start: 2020-06-16 | End: 2020-09-28

## 2020-07-15 RX ORDER — LISINOPRIL 10 MG/1
TABLET ORAL
Qty: 90 TABLET | Refills: 0 | Status: SHIPPED | OUTPATIENT
Start: 2020-07-15 | End: 2020-10-12

## 2020-07-15 NOTE — TELEPHONE ENCOUNTER
Last office visit:  12/20/19  Last cmp: 04/16/20  #90, no refills  Last bp:  12/20/19   138/80    No future appointments. Calling pt to schedule bp check with DR. [facility] at [place]. When calling \"home\"-there was an answer and then a hang up.   Calling mobile-

## 2020-09-17 RX ORDER — CITALOPRAM 20 MG/1
TABLET ORAL
Qty: 90 TABLET | Refills: 0 | Status: SHIPPED | OUTPATIENT
Start: 2020-09-17 | End: 2020-12-14

## 2020-09-17 NOTE — TELEPHONE ENCOUNTER
Last OV: 2/21/20  Last refill: 3/20/19 #90 Tablets w/ 3 refills  Requested Prescriptions     Pending Prescriptions Disp Refills   • CITALOPRAM HYDROBROMIDE 20 MG Oral Tab [Pharmacy Med Name: Citalopram Hydrobromide 20 MG Oral Tablet] 90 tablet 0     Sig: T

## 2020-09-28 RX ORDER — METOPROLOL TARTRATE 50 MG/1
TABLET, FILM COATED ORAL
Qty: 180 TABLET | Refills: 0 | Status: SHIPPED | OUTPATIENT
Start: 2020-09-28 | End: 2020-12-21

## 2020-09-28 NOTE — TELEPHONE ENCOUNTER
Last refill #180 on 6/16/82020  Last office visit on 12/20/19  No future appointments. BP Readings from Last 3 Encounters:  12/20/19 : 138/80  04/20/19 : 130/70  03/29/19 : 136/82    Labs current until December.

## 2020-10-12 RX ORDER — LISINOPRIL 10 MG/1
TABLET ORAL
Qty: 90 TABLET | Refills: 0 | Status: SHIPPED | OUTPATIENT
Start: 2020-10-12 | End: 2021-01-08

## 2020-10-12 NOTE — TELEPHONE ENCOUNTER
Last refill #90 on 7/15/2020  Last office visit on 12/20/19  No future appointments.   BP Readings from Last 3 Encounters:  12/20/19 : 138/80  04/20/19 : 130/70  03/29/19 : 136/82

## 2020-12-14 RX ORDER — CITALOPRAM 20 MG/1
20 TABLET ORAL EVERY MORNING
Qty: 30 TABLET | Refills: 0 | Status: SHIPPED | OUTPATIENT
Start: 2020-12-14 | End: 2021-01-08

## 2020-12-14 NOTE — TELEPHONE ENCOUNTER
Last refill #90 on 9/17/2020  Last office visit pertaining to refill on 12/20/19 - cpx  No future appointments. Due for annual physical  Reminder letter sent.

## 2020-12-21 RX ORDER — ATORVASTATIN CALCIUM 40 MG/1
TABLET, FILM COATED ORAL
Qty: 30 TABLET | Refills: 0 | Status: SHIPPED | OUTPATIENT
Start: 2020-12-21 | End: 2021-01-25

## 2020-12-21 RX ORDER — METOPROLOL TARTRATE 50 MG/1
50 TABLET, FILM COATED ORAL 2 TIMES DAILY
Qty: 60 TABLET | Refills: 0 | Status: SHIPPED | OUTPATIENT
Start: 2020-12-21 | End: 2021-02-23

## 2020-12-21 NOTE — TELEPHONE ENCOUNTER
Last refill on atorvastatin #90 x 2 on 3/30/2020  Last refill on lisinopril #90 on 10/12/2020  Last office visit pertaining to refill on 12/20/19 - cpx  No future appointments. Patient is due for office visit and labs. Reminder letter sent.

## 2021-01-08 RX ORDER — LISINOPRIL 10 MG/1
10 TABLET ORAL DAILY
Qty: 30 TABLET | Refills: 0 | Status: SHIPPED | OUTPATIENT
Start: 2021-01-08 | End: 2021-02-08

## 2021-01-08 RX ORDER — CITALOPRAM 20 MG/1
20 TABLET ORAL EVERY MORNING
Qty: 30 TABLET | Refills: 0 | Status: SHIPPED | OUTPATIENT
Start: 2021-01-08 | End: 2021-02-08

## 2021-01-08 NOTE — TELEPHONE ENCOUNTER
Lisinopril: 10/12/20 #90 w/ 0 refills  Citalopram: 12/14/20 #30 w/ 0 refills    Last OV: 12/20/19  Last labs: 12/20/19    No future appointments. Due for OV-- [employer] message sent.

## 2021-01-23 ENCOUNTER — MOBILE ENCOUNTER (OUTPATIENT)
Dept: FAMILY MEDICINE CLINIC | Facility: CLINIC | Age: 61
End: 2021-01-23

## 2021-01-23 RX ORDER — METOPROLOL TARTRATE 50 MG/1
50 TABLET, FILM COATED ORAL 2 TIMES DAILY
Qty: 60 TABLET | Refills: 5 | Status: SHIPPED | OUTPATIENT
Start: 2021-01-23 | End: 2021-02-23

## 2021-01-25 RX ORDER — METOPROLOL TARTRATE 50 MG/1
TABLET, FILM COATED ORAL
Qty: 60 TABLET | Refills: 0 | OUTPATIENT
Start: 2021-01-25

## 2021-01-25 RX ORDER — ATORVASTATIN CALCIUM 40 MG/1
TABLET, FILM COATED ORAL
Qty: 30 TABLET | Refills: 0 | Status: SHIPPED | OUTPATIENT
Start: 2021-01-25 | End: 2021-02-23

## 2021-01-25 NOTE — TELEPHONE ENCOUNTER
Last refill on metoprolol #60 x 5 refills. Refill denied - duplicate    Last refill on atorvastatin #30 on 12/21/2020  No future appointments. Last lipids 12/20/19  Patient is overdue for labs and office visit   Reminder letter sent.   #30 pended for appr

## 2021-02-08 RX ORDER — LISINOPRIL 10 MG/1
TABLET ORAL
Qty: 30 TABLET | Refills: 0 | Status: SHIPPED | OUTPATIENT
Start: 2021-02-08 | End: 2021-02-23

## 2021-02-08 RX ORDER — CITALOPRAM 20 MG/1
TABLET ORAL
Qty: 30 TABLET | Refills: 0 | Status: SHIPPED | OUTPATIENT
Start: 2021-02-08 | End: 2021-02-23

## 2021-02-08 NOTE — TELEPHONE ENCOUNTER
Last refill on citalopram #30 on 1/8/2021  Last refill on lisinopril #30 on 1/8/2021of this i  Last office visit pertaining to refill on 12/20/19  No future appointments.   Patient is due for labs and office visit - patient has been notified of this in the

## 2021-02-23 ENCOUNTER — LAB ENCOUNTER (OUTPATIENT)
Dept: LAB | Age: 61
End: 2021-02-23
Attending: FAMILY MEDICINE
Payer: COMMERCIAL

## 2021-02-23 ENCOUNTER — OFFICE VISIT (OUTPATIENT)
Dept: FAMILY MEDICINE CLINIC | Facility: CLINIC | Age: 61
End: 2021-02-23
Payer: COMMERCIAL

## 2021-02-23 VITALS
OXYGEN SATURATION: 98 % | HEIGHT: 69.5 IN | RESPIRATION RATE: 16 BRPM | DIASTOLIC BLOOD PRESSURE: 80 MMHG | HEART RATE: 56 BPM | BODY MASS INDEX: 32.14 KG/M2 | TEMPERATURE: 98 F | WEIGHT: 222 LBS | SYSTOLIC BLOOD PRESSURE: 120 MMHG

## 2021-02-23 DIAGNOSIS — Z00.00 PREVENTATIVE HEALTH CARE: Primary | ICD-10-CM

## 2021-02-23 DIAGNOSIS — Z95.1 S/P CABG X 3: ICD-10-CM

## 2021-02-23 DIAGNOSIS — F41.9 ANXIETY: ICD-10-CM

## 2021-02-23 DIAGNOSIS — R73.9 HYPERGLYCEMIA: ICD-10-CM

## 2021-02-23 DIAGNOSIS — E78.00 HYPERCHOLESTEREMIA: ICD-10-CM

## 2021-02-23 DIAGNOSIS — Z12.5 PROSTATE CANCER SCREENING: ICD-10-CM

## 2021-02-23 DIAGNOSIS — I10 ESSENTIAL HYPERTENSION, BENIGN: ICD-10-CM

## 2021-02-23 DIAGNOSIS — Z12.11 COLON CANCER SCREENING: ICD-10-CM

## 2021-02-23 LAB
ALBUMIN SERPL-MCNC: 3.7 G/DL (ref 3.4–5)
ALBUMIN/GLOB SERPL: 1.2 {RATIO} (ref 1–2)
ALP LIVER SERPL-CCNC: 73 U/L
ALT SERPL-CCNC: 32 U/L
ANION GAP SERPL CALC-SCNC: 4 MMOL/L (ref 0–18)
AST SERPL-CCNC: 25 U/L (ref 15–37)
BILIRUB SERPL-MCNC: 1.3 MG/DL (ref 0.1–2)
BUN BLD-MCNC: 16 MG/DL (ref 7–18)
BUN/CREAT SERPL: 14.5 (ref 10–20)
CALCIUM BLD-MCNC: 8.7 MG/DL (ref 8.5–10.1)
CHLORIDE SERPL-SCNC: 105 MMOL/L (ref 98–112)
CHOLEST SMN-MCNC: 120 MG/DL (ref ?–200)
CO2 SERPL-SCNC: 29 MMOL/L (ref 21–32)
COMPLEXED PSA SERPL-MCNC: 0.74 NG/ML (ref ?–4)
CREAT BLD-MCNC: 1.1 MG/DL
EST. AVERAGE GLUCOSE BLD GHB EST-MCNC: 117 MG/DL (ref 68–126)
GLOBULIN PLAS-MCNC: 3 G/DL (ref 2.8–4.4)
GLUCOSE BLD-MCNC: 105 MG/DL (ref 70–99)
HBA1C MFR BLD HPLC: 5.7 % (ref ?–5.7)
HDLC SERPL-MCNC: 35 MG/DL (ref 40–59)
LDLC SERPL CALC-MCNC: 61 MG/DL (ref ?–100)
M PROTEIN MFR SERPL ELPH: 6.7 G/DL (ref 6.4–8.2)
NONHDLC SERPL-MCNC: 85 MG/DL (ref ?–130)
OSMOLALITY SERPL CALC.SUM OF ELEC: 288 MOSM/KG (ref 275–295)
PATIENT FASTING Y/N/NP: YES
PATIENT FASTING Y/N/NP: YES
POTASSIUM SERPL-SCNC: 5 MMOL/L (ref 3.5–5.1)
SODIUM SERPL-SCNC: 138 MMOL/L (ref 136–145)
TRIGL SERPL-MCNC: 122 MG/DL (ref 30–149)
VLDLC SERPL CALC-MCNC: 24 MG/DL (ref 0–30)

## 2021-02-23 PROCEDURE — 99213 OFFICE O/P EST LOW 20 MIN: CPT | Performed by: FAMILY MEDICINE

## 2021-02-23 PROCEDURE — 84153 ASSAY OF PSA TOTAL: CPT | Performed by: FAMILY MEDICINE

## 2021-02-23 PROCEDURE — 83036 HEMOGLOBIN GLYCOSYLATED A1C: CPT | Performed by: FAMILY MEDICINE

## 2021-02-23 PROCEDURE — 80053 COMPREHEN METABOLIC PANEL: CPT | Performed by: FAMILY MEDICINE

## 2021-02-23 PROCEDURE — 3074F SYST BP LT 130 MM HG: CPT | Performed by: FAMILY MEDICINE

## 2021-02-23 PROCEDURE — 3008F BODY MASS INDEX DOCD: CPT | Performed by: FAMILY MEDICINE

## 2021-02-23 PROCEDURE — 80061 LIPID PANEL: CPT | Performed by: FAMILY MEDICINE

## 2021-02-23 PROCEDURE — 36415 COLL VENOUS BLD VENIPUNCTURE: CPT | Performed by: FAMILY MEDICINE

## 2021-02-23 PROCEDURE — 99396 PREV VISIT EST AGE 40-64: CPT | Performed by: FAMILY MEDICINE

## 2021-02-23 PROCEDURE — 3079F DIAST BP 80-89 MM HG: CPT | Performed by: FAMILY MEDICINE

## 2021-02-23 RX ORDER — ALBUTEROL SULFATE 90 UG/1
2 AEROSOL, METERED RESPIRATORY (INHALATION) 4 TIMES DAILY
Qty: 1 INHALER | Refills: 1 | Status: SHIPPED | OUTPATIENT
Start: 2021-02-23

## 2021-02-23 RX ORDER — ATORVASTATIN CALCIUM 40 MG/1
TABLET, FILM COATED ORAL
Qty: 90 TABLET | Refills: 1 | Status: SHIPPED | OUTPATIENT
Start: 2021-02-23 | End: 2021-08-16

## 2021-02-23 RX ORDER — CITALOPRAM 20 MG/1
20 TABLET ORAL EVERY MORNING
Qty: 90 TABLET | Refills: 1 | Status: SHIPPED | OUTPATIENT
Start: 2021-02-23 | End: 2021-08-30

## 2021-02-23 RX ORDER — METOPROLOL TARTRATE 50 MG/1
50 TABLET, FILM COATED ORAL 2 TIMES DAILY
Qty: 180 TABLET | Refills: 1 | Status: SHIPPED | OUTPATIENT
Start: 2021-02-23 | End: 2021-09-13

## 2021-02-23 RX ORDER — LISINOPRIL 10 MG/1
10 TABLET ORAL DAILY
Qty: 90 TABLET | Refills: 1 | Status: SHIPPED | OUTPATIENT
Start: 2021-02-23 | End: 2021-08-30

## 2021-02-23 NOTE — PROGRESS NOTES
Lambert Green is a 61year old male. Patient presents with:  Physical: Room 2- adjust meds and a physical      HPI:   Patient has not been seen since the end of December 2019. He is feeling well.   He did have a nuclear stress test in February 2020 [facility] Laterality Date   • CABG      On 5/4/2018:CABG x 3   • HEART CORONARY ARTERY BYPASS GRAFT N/A 5/4/2018    Performed by Anuel Gutierrez MD at [address]       History reviewed. No pertinent family history.      Social History:  Social History hypertension, benign  Prostate cancer screening  Hypercholesteremia  Colon cancer screening  Anxiety    Needs to follow up with Dr Viviane Garcia for colon cancer screening  Needs office visit every 6 months  Encouraged diet, exercise, weight loss of

## 2021-02-24 ENCOUNTER — PATIENT MESSAGE (OUTPATIENT)
Dept: FAMILY MEDICINE CLINIC | Facility: CLINIC | Age: 61
End: 2021-02-24

## 2021-02-24 DIAGNOSIS — Z00.00 PREVENTATIVE HEALTH CARE: Primary | ICD-10-CM

## 2021-02-24 DIAGNOSIS — E78.00 HYPERCHOLESTEREMIA: ICD-10-CM

## 2021-02-24 DIAGNOSIS — I10 HYPERTENSION, UNSPECIFIED TYPE: ICD-10-CM

## 2021-02-24 DIAGNOSIS — Z12.5 PROSTATE CANCER SCREENING: ICD-10-CM

## 2021-02-24 DIAGNOSIS — R73.9 HYPERGLYCEMIA: ICD-10-CM

## 2021-02-24 NOTE — TELEPHONE ENCOUNTER
From: Cliff Aleman  To: Stanley Patel DO  Sent: 2/24/2021 1:07 PM CST  Subject: Test Results Question    Hi Just to let you know , I had coffee with sugar in it before my lab test on 2/23/21.       Thanks Marisol Paredes

## 2021-03-11 LAB — AMB EXT COLOGUARD RESULT: POSITIVE

## 2021-03-15 ENCOUNTER — PATIENT MESSAGE (OUTPATIENT)
Dept: FAMILY MEDICINE CLINIC | Facility: CLINIC | Age: 61
End: 2021-03-15

## 2021-03-15 NOTE — TELEPHONE ENCOUNTER
From: Melinda Augustine  To: Sharon Goldstein DO  Sent: 3/15/2021 3:18 PM CDT  Subject: Other    Hi, I'm finally finishing up on getting my license back. The  needs you to fill out this form.  After you fill it out you can then scan it and send it jj

## 2021-03-17 ENCOUNTER — TELEPHONE (OUTPATIENT)
Dept: FAMILY MEDICINE CLINIC | Facility: CLINIC | Age: 61
End: 2021-03-17

## 2021-03-17 DIAGNOSIS — R19.5 POSITIVE COLORECTAL CANCER SCREENING USING COLOGUARD TEST: Primary | ICD-10-CM

## 2021-03-17 NOTE — TELEPHONE ENCOUNTER
Results/recommendations from Dr. Vandana Stanton:    [facility] needs to follow-up with Dr. Sweetie Diaz. Referral placed. Left msg for pt to call back.

## 2021-03-18 ENCOUNTER — TELEPHONE (OUTPATIENT)
Dept: FAMILY MEDICINE CLINIC | Facility: CLINIC | Age: 61
End: 2021-03-18

## 2021-03-18 NOTE — TELEPHONE ENCOUNTER
Pt. Saw the positive covid result in his My Chart? He said he never tested for covid nor did he know he was positive. Was this detected when he did his cologuard test? He is also wanting the cologuard results.  Please call before 2 pm today if possible, he

## 2021-03-18 NOTE — TELEPHONE ENCOUNTER
Upon review of chart test was a positive for Cologuard test; NOT Covid 19. Patient asks why that is on his chart- put in as external results. Told patient I would talk to Jessica Wu, Dr Juan Jose Lane nurse tomorrow to get it straightened out.   Patient states h

## 2021-03-19 NOTE — TELEPHONE ENCOUNTER
COVID+ result has been removed from pt's chart by the Epic team. This was entered by error. Advised pt that he is not COVID positive. His cologuard results IS positive. He will need to follow-up with GI. Pt aware and will be calling to make an appt.

## 2021-04-05 NOTE — TELEPHONE ENCOUNTER
I knew nothing about his DUI. I need to know, has he been driving? .  If not how long has it been since he did? Is he drinking at all? If he is, how much is he drinking per day?

## 2021-04-05 NOTE — TELEPHONE ENCOUNTER
Dr. Hilda Delacruz is requesting a 15-min appt with Raul Jordan to discuss some questions regarding his paperwork. Left detailed msg for Raul Jordan to call back and schedule appt.

## 2021-04-09 NOTE — TELEPHONE ENCOUNTER
Left voicemail and also sent [employer] msg. Pt has read msg. Have not heard back from the patient. Will close encounter.

## 2021-04-14 ENCOUNTER — TELEPHONE (OUTPATIENT)
Dept: FAMILY MEDICINE CLINIC | Facility: CLINIC | Age: 61
End: 2021-04-14

## 2021-04-14 ENCOUNTER — OFFICE VISIT (OUTPATIENT)
Dept: FAMILY MEDICINE CLINIC | Facility: CLINIC | Age: 61
End: 2021-04-14
Payer: COMMERCIAL

## 2021-04-14 VITALS
HEIGHT: 68.5 IN | HEART RATE: 55 BPM | DIASTOLIC BLOOD PRESSURE: 80 MMHG | TEMPERATURE: 98 F | BODY MASS INDEX: 32.88 KG/M2 | WEIGHT: 219.5 LBS | OXYGEN SATURATION: 97 % | SYSTOLIC BLOOD PRESSURE: 120 MMHG

## 2021-04-14 DIAGNOSIS — Z02.4 ENCOUNTER FOR EXAMINATION FOR DRIVING LICENSE: Primary | ICD-10-CM

## 2021-04-14 PROCEDURE — 99214 OFFICE O/P EST MOD 30 MIN: CPT | Performed by: FAMILY MEDICINE

## 2021-04-14 PROCEDURE — 3074F SYST BP LT 130 MM HG: CPT | Performed by: FAMILY MEDICINE

## 2021-04-14 PROCEDURE — 3079F DIAST BP 80-89 MM HG: CPT | Performed by: FAMILY MEDICINE

## 2021-04-14 PROCEDURE — 3008F BODY MASS INDEX DOCD: CPT | Performed by: FAMILY MEDICINE

## 2021-04-14 NOTE — PROGRESS NOTES
Erika Cartwright is a 61year old male. 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 ...room 1      HPI:   Chadwick Brigido was at a [facility] [place] in June 2019. He had had a few beers at the [facility].   On his way roberto disease) ([facility] [address]    • Depression    • Heart attack ([facility] [address]    • Hyperlipidemia LDL goal <70    • Hypertension    • S/P CABG x 3     On 5/4/2018:CABG x 3     Past Surgical History:   Procedure Laterality Date   • CABG      On 5/4/2018:CABG x 3   • HEART CORONAR in this encounter       Imaging & Consults:  None

## 2021-04-14 NOTE — TELEPHONE ENCOUNTER
Pt advised [address] Form is ready. He requested to mail him a copy.      Also mailed a copy to [facility], [address] [facility] [address] [facility], [address]

## 2021-05-18 ENCOUNTER — TELEPHONE (OUTPATIENT)
Dept: FAMILY MEDICINE CLINIC | Facility: CLINIC | Age: 61
End: 2021-05-18

## 2021-05-18 ENCOUNTER — OFFICE VISIT (OUTPATIENT)
Dept: FAMILY MEDICINE CLINIC | Facility: CLINIC | Age: 61
End: 2021-05-18
Payer: COMMERCIAL

## 2021-05-18 VITALS
RESPIRATION RATE: 16 BRPM | DIASTOLIC BLOOD PRESSURE: 80 MMHG | HEIGHT: 68.5 IN | WEIGHT: 223 LBS | BODY MASS INDEX: 33.41 KG/M2 | SYSTOLIC BLOOD PRESSURE: 130 MMHG | HEART RATE: 60 BPM | OXYGEN SATURATION: 97 % | TEMPERATURE: 97 F

## 2021-05-18 DIAGNOSIS — S93.401D SPRAIN OF RIGHT ANKLE, UNSPECIFIED LIGAMENT, SUBSEQUENT ENCOUNTER: Primary | ICD-10-CM

## 2021-05-18 PROCEDURE — 3008F BODY MASS INDEX DOCD: CPT | Performed by: FAMILY MEDICINE

## 2021-05-18 PROCEDURE — 99214 OFFICE O/P EST MOD 30 MIN: CPT | Performed by: FAMILY MEDICINE

## 2021-05-18 PROCEDURE — 3075F SYST BP GE 130 - 139MM HG: CPT | Performed by: FAMILY MEDICINE

## 2021-05-18 PROCEDURE — 3079F DIAST BP 80-89 MM HG: CPT | Performed by: FAMILY MEDICINE

## 2021-05-18 NOTE — TELEPHONE ENCOUNTER
Per Dr. Maikel Serrano recommendations, advised Joe Boeck that he should wear air split all day as tolerated and may take it off at night when he goes to sleep. Verbalized understanding.

## 2021-05-18 NOTE — PROGRESS NOTES
Cl Ruiz is a 61year old male. Patient presents with: Other: liability. . room 1      HPI:   Patient was at work May 13. He was up on a ladder about 6 feet off the ground.   The latter became unstable and actually fell out from under him but he w TONSILLECTOMY       History reviewed. No pertinent family history.      Social History:  Social History    Tobacco Use      Smoking status: Former Smoker        Packs/day: 0.50        Years: 35.00        Pack years: 17.5        Quit date: 5/3/2018        Babak Savage Will replace with an air splint. Increase activity as tolerated. I like to see him back in 1 week. If his pain is persisting or if it gets worse anytime prior, will repeat the x-ray. He needs to remain off of work until he is seen again in 1 week.   Ice

## 2021-05-20 ENCOUNTER — TELEPHONE (OUTPATIENT)
Dept: FAMILY MEDICINE CLINIC | Facility: CLINIC | Age: 61
End: 2021-05-20

## 2021-05-20 NOTE — TELEPHONE ENCOUNTER
Hira Boeck reports swelling to right ankle. This is noted only after he ambulates. No loss of sensation. He has not been applying ice, he states he forgot about this.      Advised pt to elevate leg after any activity, he should also apply ice to help with swelli

## 2021-05-21 ENCOUNTER — TELEPHONE (OUTPATIENT)
Dept: FAMILY MEDICINE CLINIC | Facility: CLINIC | Age: 61
End: 2021-05-21

## 2021-05-21 NOTE — TELEPHONE ENCOUNTER
Spoke with patient and received a verbal stating that it is ok for this nurse to fax over last office note. He states this is for a workmans comp issue. Note faxed.

## 2021-05-25 ENCOUNTER — OFFICE VISIT (OUTPATIENT)
Dept: FAMILY MEDICINE CLINIC | Facility: CLINIC | Age: 61
End: 2021-05-25
Payer: OTHER MISCELLANEOUS

## 2021-05-25 VITALS
HEART RATE: 53 BPM | TEMPERATURE: 97 F | DIASTOLIC BLOOD PRESSURE: 80 MMHG | OXYGEN SATURATION: 96 % | WEIGHT: 220 LBS | SYSTOLIC BLOOD PRESSURE: 120 MMHG | BODY MASS INDEX: 33 KG/M2

## 2021-05-25 DIAGNOSIS — S93.401D SPRAIN OF RIGHT ANKLE, UNSPECIFIED LIGAMENT, SUBSEQUENT ENCOUNTER: Primary | ICD-10-CM

## 2021-05-25 PROCEDURE — 3079F DIAST BP 80-89 MM HG: CPT | Performed by: FAMILY MEDICINE

## 2021-05-25 PROCEDURE — 3074F SYST BP LT 130 MM HG: CPT | Performed by: FAMILY MEDICINE

## 2021-05-25 PROCEDURE — 99213 OFFICE O/P EST LOW 20 MIN: CPT | Performed by: FAMILY MEDICINE

## 2021-05-25 NOTE — PROGRESS NOTES
April Benavides is a 61year old male. Patient presents with: Worker's Comp: fup on work comp for RT ankle. ...room 1      HPI:   Patient sprained his right ankle May 13 at work. He has been wearing an air splint for the past 2 weeks.   Feels it is gettin SYSTEMS:   GENERAL HEALTH: feels well otherwise  SKIN: denies any unusual skin lesions or rashes  RESPIRATORY: denies shortness of breath   CARDIOVASCULAR: denies chest pain   GI: denies nausea, vomiting, diarrhea or abdominal pain   NEURO: denies headache

## 2021-05-26 ENCOUNTER — TELEPHONE (OUTPATIENT)
Dept: FAMILY MEDICINE CLINIC | Facility: CLINIC | Age: 61
End: 2021-05-26

## 2021-05-26 NOTE — TELEPHONE ENCOUNTER
Spoke with patient and was given verbal consent to fax OV notes to Danie Brown at [employer]. OV notes from 5/25/21 faxed to [phone].

## 2021-05-26 NOTE — TELEPHONE ENCOUNTER
Was pt seen yesterday for a w/c visit? Hannah Marcano is needing the office notes faxed [phone].

## 2021-07-24 ENCOUNTER — PATIENT MESSAGE (OUTPATIENT)
Dept: FAMILY MEDICINE CLINIC | Facility: CLINIC | Age: 61
End: 2021-07-24

## 2021-07-26 NOTE — TELEPHONE ENCOUNTER
From: Rashad Gutierrez  To: Devang Guerrero DO  Sent: 7/24/2021 5:32 PM CDT  Subject: Referral Request    HI. I need a different Cardiologist since Dr Cecile Gaytan is no longer seeing patients at the [facility]. Patient Instructions by Yessica Junior MD at 12/29/17 12:53 PM     Author:  Yessica Junior MD Service:  (none) Author Type:  Physician     Filed:  12/29/17 12:53 PM Encounter Date:  12/29/2017 Status:  Signed     :  Yessica Junior MD (Physician)            Continue current regimen    Limit salt  Regular exercise    Take probiotics such as align     PATIENT INFORMATION   -- Please go to the lab now to have your labwork completed. Your doctor’s office will notify you of your results within 10 working days.    Follow-Up  -- Make an appointment with Yessica Junior MD in six months     Additional Educational Resources:  For additional resources regarding your symptoms, diagnosis, or further health information, please visit the Health Resources section on [email or web address] or the Online Health Resources section in [employer].          Revision History        User Key Date/Time User Provider Type Action    > [N/A] 12/29/17 12:53 PM Yessica Junior MD Physician Sign

## 2021-08-10 ENCOUNTER — LAB ENCOUNTER (OUTPATIENT)
Dept: LAB | Age: 61
End: 2021-08-10
Attending: FAMILY MEDICINE
Payer: COMMERCIAL

## 2021-08-10 DIAGNOSIS — Z01.818 PRE-OP TESTING: ICD-10-CM

## 2021-08-11 LAB — SARS-COV-2 RNA RESP QL NAA+PROBE: NOT DETECTED

## 2021-08-13 PROBLEM — D12.5 BENIGN NEOPLASM OF SIGMOID COLON: Status: ACTIVE | Noted: 2021-08-13

## 2021-08-13 PROBLEM — K57.30 DIVERTICULOSIS OF LARGE INTESTINE WITHOUT PERFORATION OR ABSCESS WITHOUT BLEEDING: Status: ACTIVE | Noted: 2021-08-13

## 2021-08-13 PROBLEM — D12.2 BENIGN NEOPLASM OF ASCENDING COLON: Status: ACTIVE | Noted: 2021-08-13

## 2021-08-13 PROBLEM — R19.5 OCCULT BLOOD IN STOOLS: Status: ACTIVE | Noted: 2021-08-13

## 2021-08-13 PROBLEM — D12.3 BENIGN NEOPLASM OF TRANSVERSE COLON: Status: ACTIVE | Noted: 2021-08-13

## 2021-08-16 RX ORDER — ATORVASTATIN CALCIUM 40 MG/1
TABLET, FILM COATED ORAL
Qty: 90 TABLET | Refills: 0 | Status: SHIPPED | OUTPATIENT
Start: 2021-08-16 | End: 2021-11-08

## 2021-08-16 NOTE — TELEPHONE ENCOUNTER
Last refill #90 x 1 on 2/23/2021  Last office visit pertaining to refill on 4/14/2021 -wellness  No future appointments.   Labs current until 2/23/2022  Refilled per protocol

## 2021-08-30 RX ORDER — LISINOPRIL 10 MG/1
TABLET ORAL
Qty: 90 TABLET | Refills: 0 | Status: SHIPPED | OUTPATIENT
Start: 2021-08-30 | End: 2021-11-22

## 2021-08-30 RX ORDER — CITALOPRAM 20 MG/1
TABLET ORAL
Qty: 90 TABLET | Refills: 0 | Status: SHIPPED | OUTPATIENT
Start: 2021-08-30 | End: 2021-11-22

## 2021-08-30 NOTE — TELEPHONE ENCOUNTER
LOV 05/21/021    LAST LAB 2/24/2021    LAST RX lisinopril 02/23/2021 90 tablets 1 refill,  Citalopram 02/23/2021 90 tablets 1 refill    Next OV No future appointments.     PROTOCOLHypertension Medications Protocol [id number]/28/2021 10:34 PM   CMP or BMP in p

## 2021-09-13 RX ORDER — METOPROLOL TARTRATE 50 MG/1
TABLET, FILM COATED ORAL
Qty: 180 TABLET | Refills: 0 | Status: SHIPPED | OUTPATIENT
Start: 2021-09-13 | End: 2021-12-06

## 2021-09-13 NOTE — TELEPHONE ENCOUNTER
Requested Prescriptions     Pending Prescriptions Disp Refills   • METOPROLOL TARTRATE 50 MG Oral Tab [Pharmacy Med Name: Metoprolol Tartrate 50 MG Oral Tablet] 180 tablet 0     Sig: Take 1 tablet by mouth twice daily     Last refill #180 x 1 pm 2/23/2021

## 2021-11-08 RX ORDER — ATORVASTATIN CALCIUM 40 MG/1
TABLET, FILM COATED ORAL
Qty: 90 TABLET | Refills: 0 | Status: SHIPPED | OUTPATIENT
Start: 2021-11-08

## 2021-11-08 NOTE — TELEPHONE ENCOUNTER
Requested Prescriptions     Pending Prescriptions Disp Refills   • ATORVASTATIN 40 MG Oral Tab [Pharmacy Med Name: Atorvastatin Calcium 40 MG Oral Tablet] 90 tablet 0     Sig: Take 1 tablet by mouth nightly     Last refill #90 on 8/16/2021  Last office vis

## 2021-11-22 RX ORDER — LISINOPRIL 10 MG/1
TABLET ORAL
Qty: 90 TABLET | Refills: 0 | Status: SHIPPED | OUTPATIENT
Start: 2021-11-22

## 2021-11-22 RX ORDER — CITALOPRAM 20 MG/1
TABLET ORAL
Qty: 90 TABLET | Refills: 0 | Status: SHIPPED | OUTPATIENT
Start: 2021-11-22

## 2021-11-22 NOTE — TELEPHONE ENCOUNTER
Lisinopril  Last refill: 08/30/21  Qty: 90  W/ 0 refills  Last ov: 05/25/21    Citalopram  Last refill: 08/30/21  Qty: 90  W/ 0 refills   Last ov: 05/25/21    Requested Prescriptions     Pending Prescriptions Disp Refills   • CITALOPRAM 20 MG Oral Tab [Pha

## 2021-12-06 RX ORDER — METOPROLOL TARTRATE 50 MG/1
TABLET, FILM COATED ORAL
Qty: 180 TABLET | Refills: 0 | Status: SHIPPED | OUTPATIENT
Start: 2021-12-06

## 2021-12-06 NOTE — TELEPHONE ENCOUNTER
Hypertension Medications Protocol Failed 12/05/2021 05:02 PM   Protocol Details  Appointment in past 6 or next 3 months    CMP or BMP in past 12 months    Last serum creatinine< 2.0        Last office visit:  05/25/21  Last refill:  09/13/21  #180, no ref

## 2022-02-07 NOTE — TELEPHONE ENCOUNTER
Cholesterol Medication Protocol Passed 02/06/2022 01:24 PM   Protocol Details  ALT < 80    ALT resulted within past year    Lipid panel within past 12 months    Appointment within past 12 or next 3 months        Last office visit:  05/25/21  Last refill:  11/08/21  #90, no refills  Last lipid:  02/23/21      No future appointments. Calling to schedule physical and fasting labs after 02/23. Left message for pt to call the office back.

## 2022-02-08 RX ORDER — ATORVASTATIN CALCIUM 40 MG/1
TABLET, FILM COATED ORAL
Qty: 30 TABLET | Refills: 0 | Status: SHIPPED | OUTPATIENT
Start: 2022-02-08 | End: 2022-02-28

## 2022-02-21 ENCOUNTER — TELEPHONE (OUTPATIENT)
Dept: FAMILY MEDICINE CLINIC | Facility: CLINIC | Age: 62
End: 2022-02-21

## 2022-02-21 RX ORDER — CITALOPRAM 20 MG/1
TABLET ORAL
Qty: 90 TABLET | Refills: 0 | Status: SHIPPED | OUTPATIENT
Start: 2022-02-21

## 2022-02-21 RX ORDER — LISINOPRIL 10 MG/1
TABLET ORAL
Qty: 90 TABLET | Refills: 0 | Status: SHIPPED | OUTPATIENT
Start: 2022-02-21

## 2022-02-21 NOTE — TELEPHONE ENCOUNTER
LOV: 2/23/21    LAST LAB: 2/23/21    LAST RX: citalopram & lisinopril - 11/22/21    Next OV:   Future Appointments   Date Time Provider Derek Osman   3/1/2022  8:30 AM DO LUPIS McdermottSW [facility] [place]       PROTOCOL  Hypertension Medications Protocol Failed 02/20/2022 08:05 AM   Protocol Details  Appointment in past 6 or next 3 months    CMP or BMP in past 12 months    Last serum creatinine< 2.0

## 2022-02-21 NOTE — TELEPHONE ENCOUNTER
REFILL LISINOPRIL & CITALOPRAM TO [place] [employer], HE WILL RUN OUT TODAY, PT HAS APPT HERE FOR [facility] [place] & LABS 3-1-22

## 2022-02-28 RX ORDER — ATORVASTATIN CALCIUM 40 MG/1
40 TABLET, FILM COATED ORAL NIGHTLY
Qty: 90 TABLET | Refills: 0 | Status: SHIPPED | OUTPATIENT
Start: 2022-02-28 | End: 2022-05-29

## 2022-02-28 RX ORDER — METOPROLOL TARTRATE 50 MG/1
50 TABLET, FILM COATED ORAL 2 TIMES DAILY
Qty: 180 TABLET | Refills: 0 | Status: SHIPPED | OUTPATIENT
Start: 2022-02-28 | End: 2022-05-29

## 2022-02-28 NOTE — TELEPHONE ENCOUNTER
HAD TO CANCEL APPT FOR PX & LABS TOMORROW DUE TO INS BEING OUT OF NETWORK, WILL BE GETTING NEW INS IN MAY OR JUNE THAT WILL BE IN NETWORK, SO HE WILL RESCHEDULE PX & LABS ONCE NEW INS STARTS, CAN  CONTINUE REFILLING HIS MEDS UNTIL HE CAN MAKE THIS APPT IN MAY OR JUNE?

## 2022-02-28 NOTE — TELEPHONE ENCOUNTER
Currently has state insurance which is out-of-network. He will have new insurance through employer starting May 2022. Requesting refills until new insurance activates. LOV: 05/25/21    LAST LAB:  02/23/21    LAST RX: metoprolol - 12/6/21, atorvastatin 2/8/22    Next OV: No future appointments.     PROTOCOL  Hypertension Medications Protocol Failed 02/28/2022 02:23 PM   Protocol Details  CMP or BMP in past 12 months    Appointment in past 6 or next 3 months    Last serum creatinine< 2.0     Cholesterol Medication Protocol Failed 02/28/2022 02:23 PM   Protocol Details  ALT < 80    ALT resulted within past year    Lipid panel within past 12 months    Appointment within past 12 or next 3 months

## 2022-03-07 RX ORDER — ATORVASTATIN CALCIUM 40 MG/1
TABLET, FILM COATED ORAL
Qty: 30 TABLET | Refills: 0 | OUTPATIENT
Start: 2022-03-07

## 2022-03-07 RX ORDER — METOPROLOL TARTRATE 50 MG/1
TABLET, FILM COATED ORAL
Qty: 180 TABLET | Refills: 0 | OUTPATIENT
Start: 2022-03-07

## 2022-04-26 ENCOUNTER — LAB ENCOUNTER (OUTPATIENT)
Dept: LAB | Age: 62
End: 2022-04-26
Attending: FAMILY MEDICINE
Payer: COMMERCIAL

## 2022-04-26 ENCOUNTER — OFFICE VISIT (OUTPATIENT)
Dept: FAMILY MEDICINE CLINIC | Facility: CLINIC | Age: 62
End: 2022-04-26
Payer: COMMERCIAL

## 2022-04-26 VITALS
BODY MASS INDEX: 34.9 KG/M2 | OXYGEN SATURATION: 98 % | DIASTOLIC BLOOD PRESSURE: 70 MMHG | SYSTOLIC BLOOD PRESSURE: 124 MMHG | RESPIRATION RATE: 18 BRPM | WEIGHT: 230.25 LBS | HEIGHT: 68 IN | TEMPERATURE: 97 F | HEART RATE: 54 BPM

## 2022-04-26 DIAGNOSIS — Z95.1 S/P CABG X 3: ICD-10-CM

## 2022-04-26 DIAGNOSIS — Z00.00 PREVENTATIVE HEALTH CARE: Primary | ICD-10-CM

## 2022-04-26 DIAGNOSIS — R73.9 HYPERGLYCEMIA: ICD-10-CM

## 2022-04-26 DIAGNOSIS — E78.00 HYPERCHOLESTEREMIA: ICD-10-CM

## 2022-04-26 DIAGNOSIS — I10 ESSENTIAL HYPERTENSION, BENIGN: ICD-10-CM

## 2022-04-26 DIAGNOSIS — Z12.5 PROSTATE CANCER SCREENING: ICD-10-CM

## 2022-04-26 DIAGNOSIS — I25.10 CORONARY ARTERY DISEASE INVOLVING NATIVE CORONARY ARTERY OF NATIVE HEART WITHOUT ANGINA PECTORIS: ICD-10-CM

## 2022-04-26 LAB
ALBUMIN SERPL-MCNC: 4.3 G/DL (ref 3.4–5)
ALBUMIN/GLOB SERPL: 1.3 {RATIO} (ref 1–2)
ALP LIVER SERPL-CCNC: 62 U/L
ALT SERPL-CCNC: 39 U/L
ANION GAP SERPL CALC-SCNC: 6 MMOL/L (ref 0–18)
AST SERPL-CCNC: 37 U/L (ref 15–37)
BILIRUB SERPL-MCNC: 0.6 MG/DL (ref 0.1–2)
BUN BLD-MCNC: 24 MG/DL (ref 7–18)
CALCIUM BLD-MCNC: 9.3 MG/DL (ref 8.5–10.1)
CHLORIDE SERPL-SCNC: 102 MMOL/L (ref 98–112)
CHOLEST SERPL-MCNC: 157 MG/DL (ref ?–200)
CO2 SERPL-SCNC: 30 MMOL/L (ref 21–32)
COMPLEXED PSA SERPL-MCNC: 1.07 NG/ML (ref ?–4)
CREAT BLD-MCNC: 1.08 MG/DL
EST. AVERAGE GLUCOSE BLD GHB EST-MCNC: 120 MG/DL (ref 68–126)
FASTING PATIENT LIPID ANSWER: NO
FASTING STATUS PATIENT QL REPORTED: NO
GLOBULIN PLAS-MCNC: 3.3 G/DL (ref 2.8–4.4)
GLUCOSE BLD-MCNC: 76 MG/DL (ref 70–99)
HBA1C MFR BLD: 5.8 % (ref ?–5.7)
HDLC SERPL-MCNC: 39 MG/DL (ref 40–59)
LDLC SERPL CALC-MCNC: 92 MG/DL (ref ?–100)
NONHDLC SERPL-MCNC: 118 MG/DL (ref ?–130)
OSMOLALITY SERPL CALC.SUM OF ELEC: 289 MOSM/KG (ref 275–295)
POTASSIUM SERPL-SCNC: 5.1 MMOL/L (ref 3.5–5.1)
PROT SERPL-MCNC: 7.6 G/DL (ref 6.4–8.2)
SODIUM SERPL-SCNC: 138 MMOL/L (ref 136–145)
TRIGL SERPL-MCNC: 150 MG/DL (ref 30–149)
VLDLC SERPL CALC-MCNC: 24 MG/DL (ref 0–30)

## 2022-04-26 PROCEDURE — 99396 PREV VISIT EST AGE 40-64: CPT | Performed by: FAMILY MEDICINE

## 2022-04-26 PROCEDURE — 80061 LIPID PANEL: CPT

## 2022-04-26 PROCEDURE — 83036 HEMOGLOBIN GLYCOSYLATED A1C: CPT

## 2022-04-26 PROCEDURE — 3074F SYST BP LT 130 MM HG: CPT | Performed by: FAMILY MEDICINE

## 2022-04-26 PROCEDURE — 3078F DIAST BP <80 MM HG: CPT | Performed by: FAMILY MEDICINE

## 2022-04-26 PROCEDURE — 3008F BODY MASS INDEX DOCD: CPT | Performed by: FAMILY MEDICINE

## 2022-04-26 PROCEDURE — 36415 COLL VENOUS BLD VENIPUNCTURE: CPT

## 2022-04-26 PROCEDURE — 80053 COMPREHEN METABOLIC PANEL: CPT

## 2022-05-16 RX ORDER — CITALOPRAM 20 MG/1
TABLET ORAL
Qty: 90 TABLET | Refills: 1 | Status: SHIPPED | OUTPATIENT
Start: 2022-05-16

## 2022-05-16 RX ORDER — LISINOPRIL 10 MG/1
TABLET ORAL
Qty: 90 TABLET | Refills: 1 | Status: SHIPPED | OUTPATIENT
Start: 2022-05-16

## 2022-05-16 NOTE — TELEPHONE ENCOUNTER
Hypertension Medications Protocol Passed 05/15/2022 03:43 AM   Protocol Details  CMP or BMP in past 12 months    Last serum creatinine< 2.0    Appointment in past 6 or next 3 months        No protocol for citalopram  Last refill citalopram:  02/22/22  #90, no refills    Last office visit:  04/26/22  Last refill:  02/22/22  #90, no refills  Last cmp:  04/26/22  BP Readings from Last 3 Encounters:  04/26/22 : 124/70  05/25/21 : 120/80  05/18/21 : 130/80    No future appointments.

## 2022-06-06 RX ORDER — METOPROLOL TARTRATE 50 MG/1
TABLET, FILM COATED ORAL
Qty: 180 TABLET | Refills: 0 | Status: SHIPPED | OUTPATIENT
Start: 2022-06-06

## 2022-06-06 RX ORDER — ATORVASTATIN CALCIUM 40 MG/1
TABLET, FILM COATED ORAL
Qty: 90 TABLET | Refills: 0 | Status: SHIPPED | OUTPATIENT
Start: 2022-06-06

## 2022-06-06 NOTE — TELEPHONE ENCOUNTER
Cholesterol Medication Protocol Passed 06/06/2022 09:51 AM   Protocol Details  ALT < 80    ALT resulted within past year    Lipid panel within past 12 months    Appointment within past 12 or next 3 months             Hypertension Medications Protocol Passed 06/06/2022 09:53 AM   Protocol Details  CMP or BMP in past 12 months    Last serum creatinine< 2.0    Appointment in past 6 or next 3 months         Last office visit 4/26/22  Last refill 2/28/22 Metoprolol   Last refill 2/28/22 Atorvastatin   Last Lab 4/26/22  No future appointments.

## 2022-08-03 RX ORDER — ALBUTEROL SULFATE 90 UG/1
2 AEROSOL, METERED RESPIRATORY (INHALATION) 4 TIMES DAILY
Qty: 1 EACH | Refills: 0 | Status: SHIPPED | OUTPATIENT
Start: 2022-08-03

## 2022-08-03 NOTE — TELEPHONE ENCOUNTER
Asthma & COPD Medication Protocol Failed 08/03/2022 11:03 AM    Asthma Action Score greater than or equal to 20    AAP/ACT given in last 12 months    Appointment in past 6 or next 3 months      Last refill - 2/23/21 - #1 with 1 refill  Last office visit - 4/26/22

## 2022-09-07 RX ORDER — ATORVASTATIN CALCIUM 40 MG/1
40 TABLET, FILM COATED ORAL NIGHTLY
Qty: 90 TABLET | Refills: 0 | Status: SHIPPED | OUTPATIENT
Start: 2022-09-07

## 2022-09-07 RX ORDER — METOPROLOL TARTRATE 50 MG/1
50 TABLET, FILM COATED ORAL 2 TIMES DAILY
Qty: 180 TABLET | Refills: 0 | Status: SHIPPED | OUTPATIENT
Start: 2022-09-07

## 2022-09-07 NOTE — TELEPHONE ENCOUNTER
Cholesterol Medication Protocol Passed 09/06/2022 03:29 PM   Protocol Details  ALT < 80    ALT resulted within past year    Lipid panel within past 12 months    Appointment within past 12 or next 3 months         Hypertension Medications Protocol Passed 09/06/2022 03:29 PM   Protocol Details  CMP or BMP in past 12 months    Last serum creatinine< 2.0    Appointment in past 6 or next 3 months        Last office visit:  04/26/22  Last lipid:  04/26/22  Last cmp:  04/26/22  BP Readings from Last 3 Encounters:  04/26/22 : 124/70  05/25/21 : 120/80  05/18/21 : 130/80    No future appointments.     Metoprolol:  06/06/22  #180, no refills  Atorvastatin:  06/06/22  #90, no refills

## 2022-10-18 ENCOUNTER — TELEMEDICINE (OUTPATIENT)
Dept: FAMILY MEDICINE CLINIC | Facility: CLINIC | Age: 62
End: 2022-10-18

## 2022-10-18 DIAGNOSIS — U07.1 COVID: Primary | ICD-10-CM

## 2022-10-18 PROCEDURE — 99213 OFFICE O/P EST LOW 20 MIN: CPT | Performed by: INTERNAL MEDICINE

## 2022-10-19 ENCOUNTER — TELEPHONE (OUTPATIENT)
Dept: FAMILY MEDICINE CLINIC | Facility: CLINIC | Age: 62
End: 2022-10-19

## 2022-10-20 ENCOUNTER — TELEPHONE (OUTPATIENT)
Dept: FAMILY MEDICINE CLINIC | Facility: CLINIC | Age: 62
End: 2022-10-20

## 2022-10-20 NOTE — TELEPHONE ENCOUNTER
Patient said that he is feeling 100% better. He said he started with symptoms Monday night and tested positive Tuesday morning. He said that he called work and they are wanting him to go back on Monday 10/24/22. Patient advised that he needs to quarantine for 5 days and then mask another 5 days. Please email note to Emmanuel Olmedo: [email or web address]

## 2022-10-21 RX ORDER — ALBUTEROL SULFATE 90 UG/1
AEROSOL, METERED RESPIRATORY (INHALATION)
Qty: 9 G | Refills: 3 | Status: SHIPPED | OUTPATIENT
Start: 2022-10-21

## 2022-11-15 RX ORDER — CITALOPRAM 20 MG/1
20 TABLET ORAL EVERY MORNING
Qty: 90 TABLET | Refills: 1 | Status: SHIPPED | OUTPATIENT
Start: 2022-11-15

## 2022-11-16 RX ORDER — LISINOPRIL 10 MG/1
10 TABLET ORAL DAILY
Qty: 90 TABLET | Refills: 0 | Status: SHIPPED | OUTPATIENT
Start: 2022-11-16

## 2022-11-16 NOTE — TELEPHONE ENCOUNTER
LOV: 04/26/22    LAST LAB: 04/26/22    LAST RX: 09/07/22    Next OV:   Future Appointments   Date Time Provider Derek Osman   12/1/2022  9:00 AM Masood Leung MD [facility] [place]       PROTOCOL   Hypertension Medications Protocol Passed 11/16/2022 10:37 AM   Protocol Details  CMP or BMP in past 12 months    Last serum creatinine< 2.0    Appointment in past 6 or next 3 months

## 2022-11-30 RX ORDER — ATORVASTATIN CALCIUM 40 MG/1
TABLET, FILM COATED ORAL
Qty: 30 TABLET | Refills: 0 | Status: SHIPPED | OUTPATIENT
Start: 2022-11-30

## 2022-11-30 RX ORDER — METOPROLOL TARTRATE 50 MG/1
TABLET, FILM COATED ORAL
Qty: 60 TABLET | Refills: 0 | Status: SHIPPED | OUTPATIENT
Start: 2022-11-30

## 2022-11-30 NOTE — TELEPHONE ENCOUNTER
Cholesterol Medication Protocol Passed 11/30/2022 11:37 AM   Protocol Details  ALT < 80    ALT resulted within past year    Lipid panel within past 12 months    Appointment within past 12 or next 3 months     Last refill - Atorvastatin - 9/7/22 - #90   Last ALT - 4/26/22 - 39  Last lipid panel - 4/26/22  Last office visit - 4/26/22  Future Appointments   Date Time Provider Derek Osman   12/1/2022  9:00 AM Ezequiel Leung MD [facility] [place]     Refilled per protocol    Hypertension Medications Protocol Passed 11/30/2022 11:37 AM   Protocol Details  CMP or BMP in past 12 months    Last serum creatinine< 2.0    Appointment in past 6 or next 3 months     Last refill - Metoprolol -9/7/22 - #180  Last CMP - 426/22 - creatinine - 1.08  Last office visit - 4/26/22  Future Appointments   Date Time Provider Derek Osman   12/1/2022  9:00 AM Fito Sifuentes MD [facility] [place]     Refilled per protocol

## 2022-12-01 ENCOUNTER — LABORATORY ENCOUNTER (OUTPATIENT)
Dept: LAB | Age: 62
End: 2022-12-01
Attending: FAMILY MEDICINE
Payer: COMMERCIAL

## 2022-12-01 ENCOUNTER — OFFICE VISIT (OUTPATIENT)
Dept: FAMILY MEDICINE CLINIC | Facility: CLINIC | Age: 62
End: 2022-12-01
Payer: COMMERCIAL

## 2022-12-01 VITALS
DIASTOLIC BLOOD PRESSURE: 86 MMHG | OXYGEN SATURATION: 97 % | TEMPERATURE: 97 F | HEART RATE: 73 BPM | WEIGHT: 231 LBS | BODY MASS INDEX: 35.01 KG/M2 | RESPIRATION RATE: 12 BRPM | HEIGHT: 68 IN | SYSTOLIC BLOOD PRESSURE: 128 MMHG

## 2022-12-01 DIAGNOSIS — I25.10 CORONARY ARTERY DISEASE INVOLVING NATIVE CORONARY ARTERY OF NATIVE HEART WITHOUT ANGINA PECTORIS: ICD-10-CM

## 2022-12-01 DIAGNOSIS — Z79.899 ENCOUNTER FOR LONG-TERM (CURRENT) USE OF MEDICATIONS: Primary | ICD-10-CM

## 2022-12-01 DIAGNOSIS — Z95.1 S/P CABG X 3: ICD-10-CM

## 2022-12-01 DIAGNOSIS — E78.00 HYPERCHOLESTEREMIA: ICD-10-CM

## 2022-12-01 DIAGNOSIS — I10 HYPERTENSION, UNSPECIFIED TYPE: ICD-10-CM

## 2022-12-01 DIAGNOSIS — I25.2 H/O NON-ST ELEVATION MYOCARDIAL INFARCTION (NSTEMI): ICD-10-CM

## 2022-12-01 PROBLEM — E66.01 MORBID (SEVERE) OBESITY DUE TO EXCESS CALORIES (HCC): Status: ACTIVE | Noted: 2022-12-01

## 2022-12-01 LAB
ALBUMIN SERPL-MCNC: 4.1 G/DL (ref 3.4–5)
ALBUMIN/GLOB SERPL: 1.2 {RATIO} (ref 1–2)
ALP LIVER SERPL-CCNC: 62 U/L
ALT SERPL-CCNC: 41 U/L
ANION GAP SERPL CALC-SCNC: 4 MMOL/L (ref 0–18)
AST SERPL-CCNC: 29 U/L (ref 15–37)
BILIRUB SERPL-MCNC: 1.1 MG/DL (ref 0.1–2)
BUN BLD-MCNC: 31 MG/DL (ref 7–18)
CALCIUM BLD-MCNC: 9.3 MG/DL (ref 8.5–10.1)
CHLORIDE SERPL-SCNC: 101 MMOL/L (ref 98–112)
CHOLEST SERPL-MCNC: 222 MG/DL (ref ?–200)
CO2 SERPL-SCNC: 30 MMOL/L (ref 21–32)
CREAT BLD-MCNC: 1.17 MG/DL
FASTING PATIENT LIPID ANSWER: NO
FASTING STATUS PATIENT QL REPORTED: NO
GFR SERPLBLD BASED ON 1.73 SQ M-ARVRAT: 70 ML/MIN/1.73M2 (ref 60–?)
GLOBULIN PLAS-MCNC: 3.4 G/DL (ref 2.8–4.4)
GLUCOSE BLD-MCNC: 83 MG/DL (ref 70–99)
HDLC SERPL-MCNC: 42 MG/DL (ref 40–59)
LDLC SERPL CALC-MCNC: 139 MG/DL (ref ?–100)
NONHDLC SERPL-MCNC: 180 MG/DL (ref ?–130)
OSMOLALITY SERPL CALC.SUM OF ELEC: 286 MOSM/KG (ref 275–295)
POTASSIUM SERPL-SCNC: 4.2 MMOL/L (ref 3.5–5.1)
PROT SERPL-MCNC: 7.5 G/DL (ref 6.4–8.2)
SODIUM SERPL-SCNC: 135 MMOL/L (ref 136–145)
TRIGL SERPL-MCNC: 230 MG/DL (ref 30–149)
VLDLC SERPL CALC-MCNC: 43 MG/DL (ref 0–30)

## 2022-12-01 PROCEDURE — 99214 OFFICE O/P EST MOD 30 MIN: CPT | Performed by: FAMILY MEDICINE

## 2022-12-01 PROCEDURE — 80061 LIPID PANEL: CPT

## 2022-12-01 PROCEDURE — 3079F DIAST BP 80-89 MM HG: CPT | Performed by: FAMILY MEDICINE

## 2022-12-01 PROCEDURE — 3074F SYST BP LT 130 MM HG: CPT | Performed by: FAMILY MEDICINE

## 2022-12-01 PROCEDURE — 36415 COLL VENOUS BLD VENIPUNCTURE: CPT

## 2022-12-01 PROCEDURE — 3008F BODY MASS INDEX DOCD: CPT | Performed by: FAMILY MEDICINE

## 2022-12-01 PROCEDURE — 80053 COMPREHEN METABOLIC PANEL: CPT

## 2022-12-05 DIAGNOSIS — E78.00 HYPERCHOLESTEREMIA: Primary | ICD-10-CM

## 2023-01-03 RX ORDER — METOPROLOL TARTRATE 50 MG/1
TABLET, FILM COATED ORAL
Qty: 180 TABLET | Refills: 0 | Status: SHIPPED | OUTPATIENT
Start: 2023-01-03

## 2023-01-03 RX ORDER — ATORVASTATIN CALCIUM 40 MG/1
TABLET, FILM COATED ORAL
Qty: 90 TABLET | Refills: 0 | Status: SHIPPED | OUTPATIENT
Start: 2023-01-03

## 2023-01-03 NOTE — TELEPHONE ENCOUNTER
Hypertension Medications Protocol Passed 01/03/2023 09:40 AM   Protocol Details  CMP or BMP in past 12 months    Last serum creatinine< 2.0    Appointment in past 6 or next 3 months     Last refill - Metoprolol - 11/30/22 - 360   Last CMP- 12/1/22 - creatinine- 1.17  Last office visit - 12/1/22  Refilled per protocol    Cholesterol Medication Protocol Passed 01/03/2023 09:40 AM   Protocol Details  ALT < 80    ALT resulted within past year    Lipid panel within past 12 months    Appointment within past 12 or next 3 months     Last refill - Atorvastatin  - 11/30/22 - #30   Last ALT - 12/1/22 - 1.17  Last lipid panel - 12/1/22  Last office visit - 12/1/22  Refilled per protocol

## 2023-01-19 ENCOUNTER — PATIENT MESSAGE (OUTPATIENT)
Dept: FAMILY MEDICINE CLINIC | Facility: CLINIC | Age: 63
End: 2023-01-19

## 2023-01-19 NOTE — TELEPHONE ENCOUNTER
From: Kristina Chapa  To: Dereje Sol MD  Sent: 1/19/2023 5:33 AM CST  Subject: Cholesterol med    Hi. It is possible that I missed some doses and l had a higher intake of fat from eggs and mueller. I gave up the higher fat breakfast meals. If you would like to have another lipid test done we can do that with fasting. Currently I take 1 pill a day for cholesterol.         Mirlande Sims

## 2023-02-07 RX ORDER — LISINOPRIL 10 MG/1
TABLET ORAL
Qty: 90 TABLET | Refills: 0 | Status: SHIPPED | OUTPATIENT
Start: 2023-02-07

## 2023-03-10 ENCOUNTER — LABORATORY ENCOUNTER (OUTPATIENT)
Dept: LAB | Age: 63
End: 2023-03-10
Attending: FAMILY MEDICINE
Payer: COMMERCIAL

## 2023-03-10 DIAGNOSIS — I10 ESSENTIAL HYPERTENSION, BENIGN: ICD-10-CM

## 2023-03-10 DIAGNOSIS — R73.9 HYPERGLYCEMIA: ICD-10-CM

## 2023-03-10 DIAGNOSIS — E78.00 HYPERCHOLESTEREMIA: ICD-10-CM

## 2023-03-10 LAB
ALBUMIN SERPL-MCNC: 4.2 G/DL (ref 3.4–5)
ALBUMIN/GLOB SERPL: 1.3 {RATIO} (ref 1–2)
ALP LIVER SERPL-CCNC: 58 U/L
ALT SERPL-CCNC: 37 U/L
ANION GAP SERPL CALC-SCNC: 4 MMOL/L (ref 0–18)
AST SERPL-CCNC: 39 U/L (ref 15–37)
BILIRUB SERPL-MCNC: 1 MG/DL (ref 0.1–2)
BUN BLD-MCNC: 24 MG/DL (ref 7–18)
CALCIUM BLD-MCNC: 9.1 MG/DL (ref 8.5–10.1)
CHLORIDE SERPL-SCNC: 101 MMOL/L (ref 98–112)
CHOLEST SERPL-MCNC: 170 MG/DL (ref ?–200)
CO2 SERPL-SCNC: 30 MMOL/L (ref 21–32)
CREAT BLD-MCNC: 1.18 MG/DL
EST. AVERAGE GLUCOSE BLD GHB EST-MCNC: 123 MG/DL (ref 68–126)
FASTING PATIENT LIPID ANSWER: NO
FASTING STATUS PATIENT QL REPORTED: NO
GFR SERPLBLD BASED ON 1.73 SQ M-ARVRAT: 70 ML/MIN/1.73M2 (ref 60–?)
GLOBULIN PLAS-MCNC: 3.3 G/DL (ref 2.8–4.4)
GLUCOSE BLD-MCNC: 89 MG/DL (ref 70–99)
HBA1C MFR BLD: 5.9 % (ref ?–5.7)
HDLC SERPL-MCNC: 44 MG/DL (ref 40–59)
LDLC SERPL CALC-MCNC: 100 MG/DL (ref ?–100)
NONHDLC SERPL-MCNC: 126 MG/DL (ref ?–130)
OSMOLALITY SERPL CALC.SUM OF ELEC: 284 MOSM/KG (ref 275–295)
POTASSIUM SERPL-SCNC: 3.9 MMOL/L (ref 3.5–5.1)
PROT SERPL-MCNC: 7.5 G/DL (ref 6.4–8.2)
SODIUM SERPL-SCNC: 135 MMOL/L (ref 136–145)
TRIGL SERPL-MCNC: 145 MG/DL (ref 30–149)
VLDLC SERPL CALC-MCNC: 24 MG/DL (ref 0–30)

## 2023-03-10 PROCEDURE — 36415 COLL VENOUS BLD VENIPUNCTURE: CPT

## 2023-03-10 PROCEDURE — 80053 COMPREHEN METABOLIC PANEL: CPT

## 2023-03-10 PROCEDURE — 83036 HEMOGLOBIN GLYCOSYLATED A1C: CPT

## 2023-03-10 PROCEDURE — 80061 LIPID PANEL: CPT

## 2023-03-11 DIAGNOSIS — I10 HYPERTENSION, UNSPECIFIED TYPE: Primary | ICD-10-CM

## 2023-03-11 DIAGNOSIS — R73.9 HYPERGLYCEMIA: ICD-10-CM

## 2023-03-11 DIAGNOSIS — E78.00 HYPERCHOLESTEREMIA: ICD-10-CM

## 2023-03-31 RX ORDER — ATORVASTATIN CALCIUM 40 MG/1
TABLET, FILM COATED ORAL
Qty: 90 TABLET | Refills: 0 | Status: SHIPPED | OUTPATIENT
Start: 2023-03-31

## 2023-03-31 RX ORDER — METOPROLOL TARTRATE 50 MG/1
TABLET, FILM COATED ORAL
Qty: 180 TABLET | Refills: 0 | Status: SHIPPED | OUTPATIENT
Start: 2023-03-31

## 2023-03-31 NOTE — TELEPHONE ENCOUNTER
Last office visit: 12/1/22  No future appointments.     Metoprolol Tartrate 50 MG oral tab  Hypertension Medications Protocol Passed 03/31/2023 10:42 AM   Protocol Details  CMP or BMP in past 12 months    Last serum creatinine< 2.0    Appointment in past 6 or next 3 months   Last filled: 1/3/23 #180 with 0 refills  Last labs: 3/10/23 Crea- 1.18    Atorvastatin 40 MG oral tab   Cholesterol Medication Protocol Passed 03/31/2023 10:42 AM   Protocol Details  ALT < 80    ALT resulted within past year    Lipid panel within past 12 months    Appointment within past 12 or next 3 months      Last filled: 1/3/23 #90 with 0 refills  Last labs: 3/10/23 ALT- 37

## 2023-04-10 ENCOUNTER — TELEPHONE (OUTPATIENT)
Dept: FAMILY MEDICINE CLINIC | Facility: CLINIC | Age: 63
End: 2023-04-10

## 2023-04-10 NOTE — TELEPHONE ENCOUNTER
PT NEEDS MEDICATION LIST FAXED TO [facility] APPOINTMENT.  HE IS THERE NOW    [facility]-    FAX# [phone]    [facility]

## 2023-05-04 RX ORDER — LISINOPRIL 10 MG/1
TABLET ORAL
Qty: 90 TABLET | Refills: 0 | Status: SHIPPED | OUTPATIENT
Start: 2023-05-04

## 2023-05-04 RX ORDER — CITALOPRAM 20 MG/1
TABLET ORAL
Qty: 90 TABLET | Refills: 0 | Status: SHIPPED | OUTPATIENT
Start: 2023-05-04

## 2023-05-04 NOTE — TELEPHONE ENCOUNTER
Lisinopril   Last refilled 2/7/23 #90/0 refills    Citalopram  Last refilled 11/15/22 #90/1 refill    Last OV 12/1/22  Comp 3/10/23

## 2023-06-14 RX ORDER — ATORVASTATIN CALCIUM 40 MG/1
TABLET, FILM COATED ORAL
Qty: 90 TABLET | Refills: 0 | Status: SHIPPED | OUTPATIENT
Start: 2023-06-14

## 2023-07-05 ENCOUNTER — TELEPHONE (OUTPATIENT)
Dept: FAMILY MEDICINE CLINIC | Facility: CLINIC | Age: 63
End: 2023-07-05

## 2023-07-05 RX ORDER — METOPROLOL TARTRATE 50 MG/1
50 TABLET, FILM COATED ORAL 2 TIMES DAILY
Qty: 180 TABLET | Refills: 1 | Status: SHIPPED | OUTPATIENT
Start: 2023-07-05

## 2023-07-05 NOTE — TELEPHONE ENCOUNTER
Hypertension Medications Protocol [id number]/04/2023 10:06 AM   Protocol Details Appointment in past 6 or next 3 months    CMP or BMP in past 12 months    Last serum creatinine< 2.0          Last refill - 3/31/23 - #180   Last CMP - 3/10/23 - creatinine - 1.18  Last office visit - 12/1/22  No future appointments.

## 2023-07-31 RX ORDER — CITALOPRAM 20 MG/1
20 TABLET ORAL EVERY MORNING
Qty: 90 TABLET | Refills: 0 | Status: SHIPPED | OUTPATIENT
Start: 2023-07-31

## 2023-07-31 RX ORDER — LISINOPRIL 10 MG/1
10 TABLET ORAL DAILY
Qty: 90 TABLET | Refills: 0 | Status: SHIPPED | OUTPATIENT
Start: 2023-07-31

## 2023-07-31 NOTE — TELEPHONE ENCOUNTER
LISINOPRIL 10 MG Oral Tab       LOV  12-1-22    LAST LAB  3-10-23  Chem Profile Creatinine 1.18    LAST RX 5-4-23  #90    Next OV No future appointments. PROTOCOL  Hypertension Medications Protocol [id number]/31/2023 10:55 AM   Protocol Details Appointment in past 6 or next 3 months    CMP or BMP in past 12 months    Last serum creatinine< 2.0              CITALOPRAM 20 MG Oral Tab       LOV  12-1-22    LAST LAB  3-10-23    LAST RX  5-4-23  #90    Next OV  No future appointments. PROTOCOL  None    Left message on designated voicemail to call back and schedule an appointment.

## 2023-08-10 ENCOUNTER — OFFICE VISIT (OUTPATIENT)
Dept: FAMILY MEDICINE CLINIC | Facility: CLINIC | Age: 63
End: 2023-08-10
Payer: COMMERCIAL

## 2023-08-10 VITALS
SYSTOLIC BLOOD PRESSURE: 124 MMHG | TEMPERATURE: 98 F | WEIGHT: 230 LBS | RESPIRATION RATE: 18 BRPM | BODY MASS INDEX: 35 KG/M2 | OXYGEN SATURATION: 98 % | DIASTOLIC BLOOD PRESSURE: 94 MMHG | HEART RATE: 55 BPM

## 2023-08-10 DIAGNOSIS — I10 HYPERTENSION, UNSPECIFIED TYPE: Primary | ICD-10-CM

## 2023-08-10 DIAGNOSIS — E78.00 HYPERCHOLESTEREMIA: ICD-10-CM

## 2023-08-10 DIAGNOSIS — I25.2 H/O NON-ST ELEVATION MYOCARDIAL INFARCTION (NSTEMI): ICD-10-CM

## 2023-08-10 PROCEDURE — 3080F DIAST BP >= 90 MM HG: CPT

## 2023-08-10 PROCEDURE — 99213 OFFICE O/P EST LOW 20 MIN: CPT

## 2023-08-10 PROCEDURE — 3074F SYST BP LT 130 MM HG: CPT

## 2023-08-10 NOTE — PATIENT INSTRUCTIONS
Week 1: Take Citalopram 20mg on 4 days of the week and take Citalopram 10 mg on Mon, Wed, and Fri.     Week 2: Take Citalopram 10 mg daily    Week 3: Take Citalopram 10 mg every other day x 10days and then stop

## 2023-09-14 ENCOUNTER — OFFICE VISIT (OUTPATIENT)
Dept: FAMILY MEDICINE CLINIC | Facility: CLINIC | Age: 63
End: 2023-09-14
Payer: COMMERCIAL

## 2023-09-14 ENCOUNTER — LABORATORY ENCOUNTER (OUTPATIENT)
Dept: LAB | Age: 63
End: 2023-09-14
Attending: FAMILY MEDICINE
Payer: COMMERCIAL

## 2023-09-14 VITALS
TEMPERATURE: 98 F | DIASTOLIC BLOOD PRESSURE: 78 MMHG | OXYGEN SATURATION: 97 % | HEIGHT: 68 IN | BODY MASS INDEX: 35.01 KG/M2 | WEIGHT: 231 LBS | RESPIRATION RATE: 12 BRPM | HEART RATE: 51 BPM | SYSTOLIC BLOOD PRESSURE: 168 MMHG

## 2023-09-14 DIAGNOSIS — I25.10 CORONARY ARTERY DISEASE INVOLVING NATIVE CORONARY ARTERY OF NATIVE HEART WITHOUT ANGINA PECTORIS: ICD-10-CM

## 2023-09-14 DIAGNOSIS — I10 HYPERTENSION, UNSPECIFIED TYPE: ICD-10-CM

## 2023-09-14 DIAGNOSIS — E78.00 HYPERCHOLESTEREMIA: ICD-10-CM

## 2023-09-14 DIAGNOSIS — Z23 NEED FOR VACCINATION: ICD-10-CM

## 2023-09-14 DIAGNOSIS — I25.2 H/O NON-ST ELEVATION MYOCARDIAL INFARCTION (NSTEMI): ICD-10-CM

## 2023-09-14 DIAGNOSIS — Z79.899 ENCOUNTER FOR LONG-TERM (CURRENT) USE OF MEDICATIONS: ICD-10-CM

## 2023-09-14 DIAGNOSIS — F41.9 ANXIETY: ICD-10-CM

## 2023-09-14 DIAGNOSIS — D12.5 BENIGN NEOPLASM OF SIGMOID COLON: ICD-10-CM

## 2023-09-14 DIAGNOSIS — R73.9 HYPERGLYCEMIA: ICD-10-CM

## 2023-09-14 DIAGNOSIS — E66.9 OBESITY (BMI 35.0-39.9 WITHOUT COMORBIDITY): ICD-10-CM

## 2023-09-14 DIAGNOSIS — Z12.5 PROSTATE CANCER SCREENING: ICD-10-CM

## 2023-09-14 DIAGNOSIS — Z95.1 S/P CABG X 3: Primary | ICD-10-CM

## 2023-09-14 LAB
ALBUMIN SERPL-MCNC: 4.4 G/DL (ref 3.4–5)
ALBUMIN/GLOB SERPL: 1.2 {RATIO} (ref 1–2)
ALP LIVER SERPL-CCNC: 70 U/L
ALT SERPL-CCNC: 40 U/L
ANION GAP SERPL CALC-SCNC: 8 MMOL/L (ref 0–18)
AST SERPL-CCNC: 38 U/L (ref 15–37)
BILIRUB SERPL-MCNC: 1.4 MG/DL (ref 0.1–2)
BUN BLD-MCNC: 22 MG/DL (ref 7–18)
CALCIUM BLD-MCNC: 9.3 MG/DL (ref 8.5–10.1)
CHLORIDE SERPL-SCNC: 100 MMOL/L (ref 98–112)
CHOLEST SERPL-MCNC: 184 MG/DL (ref ?–200)
CO2 SERPL-SCNC: 27 MMOL/L (ref 21–32)
COMPLEXED PSA SERPL-MCNC: 0.8 NG/ML (ref ?–4)
CREAT BLD-MCNC: 1.11 MG/DL
EGFRCR SERPLBLD CKD-EPI 2021: 75 ML/MIN/1.73M2 (ref 60–?)
EST. AVERAGE GLUCOSE BLD GHB EST-MCNC: 117 MG/DL (ref 68–126)
FASTING PATIENT LIPID ANSWER: YES
FASTING STATUS PATIENT QL REPORTED: YES
GLOBULIN PLAS-MCNC: 3.6 G/DL (ref 2.8–4.4)
GLUCOSE BLD-MCNC: 73 MG/DL (ref 70–99)
HBA1C MFR BLD: 5.7 % (ref ?–5.7)
HDLC SERPL-MCNC: 42 MG/DL (ref 40–59)
LDLC SERPL CALC-MCNC: 108 MG/DL (ref ?–100)
NONHDLC SERPL-MCNC: 142 MG/DL (ref ?–130)
OSMOLALITY SERPL CALC.SUM OF ELEC: 282 MOSM/KG (ref 275–295)
POTASSIUM SERPL-SCNC: 4.3 MMOL/L (ref 3.5–5.1)
PROT SERPL-MCNC: 8 G/DL (ref 6.4–8.2)
SODIUM SERPL-SCNC: 135 MMOL/L (ref 136–145)
TRIGL SERPL-MCNC: 194 MG/DL (ref 30–149)
VLDLC SERPL CALC-MCNC: 33 MG/DL (ref 0–30)

## 2023-09-14 RX ORDER — ALBUTEROL SULFATE 90 UG/1
2 AEROSOL, METERED RESPIRATORY (INHALATION) 4 TIMES DAILY
Qty: 9 G | Refills: 3 | Status: SHIPPED | OUTPATIENT
Start: 2023-09-14

## 2023-09-14 RX ORDER — CITALOPRAM HYDROBROMIDE 10 MG/1
10 TABLET ORAL EVERY MORNING
Qty: 90 TABLET | Refills: 0 | Status: SHIPPED | OUTPATIENT
Start: 2023-09-14 | End: 2023-12-13

## 2023-09-14 RX ORDER — LISINOPRIL 10 MG/1
10 TABLET ORAL DAILY
Qty: 90 TABLET | Refills: 0 | Status: SHIPPED | OUTPATIENT
Start: 2023-09-14

## 2023-09-14 RX ORDER — METOPROLOL TARTRATE 50 MG/1
50 TABLET, FILM COATED ORAL 2 TIMES DAILY
Qty: 180 TABLET | Refills: 1 | Status: SHIPPED | OUTPATIENT
Start: 2023-09-14

## 2023-09-14 RX ORDER — ATORVASTATIN CALCIUM 40 MG/1
40 TABLET, FILM COATED ORAL NIGHTLY
Qty: 90 TABLET | Refills: 1 | Status: SHIPPED | OUTPATIENT
Start: 2023-09-14 | End: 2024-03-12

## 2023-09-17 PROBLEM — E66.9 OBESITY (BMI 35.0-39.9 WITHOUT COMORBIDITY): Status: ACTIVE | Noted: 2022-12-01

## 2023-09-20 DIAGNOSIS — R73.9 HYPERGLYCEMIA: ICD-10-CM

## 2023-09-20 DIAGNOSIS — E78.00 HYPERCHOLESTEREMIA: ICD-10-CM

## 2023-09-20 DIAGNOSIS — Z12.5 PROSTATE CANCER SCREENING: ICD-10-CM

## 2023-09-20 DIAGNOSIS — I10 HYPERTENSION, UNSPECIFIED TYPE: Primary | ICD-10-CM

## 2023-12-18 DIAGNOSIS — Z95.1 S/P CABG X 3: ICD-10-CM

## 2023-12-18 DIAGNOSIS — I10 HYPERTENSION, UNSPECIFIED TYPE: ICD-10-CM

## 2023-12-18 DIAGNOSIS — F41.9 ANXIETY: ICD-10-CM

## 2023-12-19 RX ORDER — LISINOPRIL 10 MG/1
10 TABLET ORAL DAILY
Qty: 90 TABLET | Refills: 0 | Status: SHIPPED | OUTPATIENT
Start: 2023-12-19

## 2023-12-19 RX ORDER — CITALOPRAM HYDROBROMIDE 10 MG/1
10 TABLET ORAL EVERY MORNING
Qty: 90 TABLET | Refills: 0 | Status: SHIPPED | OUTPATIENT
Start: 2023-12-19

## 2023-12-19 NOTE — TELEPHONE ENCOUNTER
LOV: 9-14-23  LAST LAB:9-14-23  LAST RX:  citalopram 10 MG Oral Tab 90 tablet 0 9/14/2023 12/13/2023   Sig:   Take 1 tablet (10 mg total) by mouth every morning. lisinopril 10 MG Oral Tab 90 tablet 0 9/14/2023 --   Sig:   Take 1 tablet (10 mg total) by mouth daily. Next OV:No future appointments.    PROTOCOL:  Hypertension Medications Protocol Yjoqxw3312/18/2023 08:05 PM   Protocol Details CMP or BMP in past 12 months    Last serum creatinine< 2.0    Appointment in past 6 or next 3 months

## 2024-03-19 DIAGNOSIS — E78.00 HYPERCHOLESTEREMIA: ICD-10-CM

## 2024-03-19 DIAGNOSIS — Z95.1 S/P CABG X 3: ICD-10-CM

## 2024-03-20 DIAGNOSIS — F41.9 ANXIETY: ICD-10-CM

## 2024-03-20 RX ORDER — ATORVASTATIN CALCIUM 40 MG/1
40 TABLET, FILM COATED ORAL NIGHTLY
Qty: 90 TABLET | Refills: 0 | Status: SHIPPED | OUTPATIENT
Start: 2024-03-20

## 2024-03-20 RX ORDER — CITALOPRAM HYDROBROMIDE 10 MG/1
10 TABLET ORAL EVERY MORNING
Qty: 90 TABLET | Refills: 1 | Status: SHIPPED | OUTPATIENT
Start: 2024-03-20

## 2024-03-20 NOTE — TELEPHONE ENCOUNTER
LOV:9-14-23  LAST LAB:9-14-23  LAST RX:  Medication Quantity Refills Start End   citalopram 10 MG Oral Tab 90 tablet 0 12/19/2023 --   Sig:   Take 1 tablet (10 mg total) by mouth every morning.       Next OV:No future appointments.   PROTOCOL  Psychiatric Non-Scheduled (Anti-Anxiety) Izwstq7103/20/2024 08:47 AM   Protocol Details In person appointment or virtual visit in the past 6 mos or appointment in next 3 mos    Depression Screening completed within the past 12 months

## 2024-03-20 NOTE — TELEPHONE ENCOUNTER
Last refilled 9/14/23 #90/1 refills  Last OV and labs 9/14/23  Mcm sent advising patient he is due for lab work

## 2024-04-18 NOTE — PROGRESS NOTES
[facility] Cardiology Progress Note - Timmy Bacon Patient Status:  Inpatient    1960 MRN [id number]   [facility] 6NE-A Attending Jorge Phelps, *   Hosp Day # 5 PCP Sindhu Lott MD     Subjective: 1435  05/04/18   1512  05/05/18   0355  05/06/18   0451  05/07/18   0431   WBC   --   16.0*  20.5*  18.7*  16.4*   HGB   --   11.8*  11.3*  9.4*  9.3*   MCV   --   88.3  89.5  92.4  92.9   PLT  162.0  170.0  209.0  204.0  210.0   INR  1.61*   --    --    - Continuous   ipratropium-albuterol (DUONEB) nebulizer solution 3 mL 3 mL Nebulization Q4H PRN   0.9%  NaCl infusion  Intravenous Continuous   HYDROcodone-acetaminophen (NORCO)  MG per tab 1 tablet 1 tablet Oral Q4H PRN   Or      HYDROcodone-acetamino Magnesium Sulfate IVPB premix SOLN 2 g 2 g Intravenous PRN   mupirocin (BACTROBAN) 2% nasal ointment OINT 1 Application 1 Application Nasal BID   Insulin Regular Human (NOVOLIN R) 100 Units in sodium chloride 0.9 % 100 mL infusion 1-57 Units/hr Intraveno secondary to acute on chronic ILD  c/w Prednisone from 4/16 for total 5 days of steroids   Continue home ofev for ILD (family will bring from home)  continue oxygen therapy on 3l  c/w duonebs, symbicort  see CT chest - interstitial lung disease without significant interval progression. No evidence of pneumonia.                       Trace pericardial effusion with mild cardiomegaly.    pulm Dr. Dubois consulted, f/u reccs  outpatient follow up with Pulmonology Lung transplant

## 2024-04-25 DIAGNOSIS — Z95.1 S/P CABG X 3: ICD-10-CM

## 2024-04-25 DIAGNOSIS — I10 HYPERTENSION, UNSPECIFIED TYPE: ICD-10-CM

## 2024-04-27 ENCOUNTER — TELEPHONE (OUTPATIENT)
Dept: FAMILY MEDICINE CLINIC | Facility: CLINIC | Age: 64
End: 2024-04-27

## 2024-04-27 DIAGNOSIS — I10 HYPERTENSION, UNSPECIFIED TYPE: ICD-10-CM

## 2024-04-27 DIAGNOSIS — Z95.1 S/P CABG X 3: ICD-10-CM

## 2024-04-27 RX ORDER — LISINOPRIL 10 MG/1
10 TABLET ORAL DAILY
Qty: 90 TABLET | Refills: 0 | OUTPATIENT
Start: 2024-04-27

## 2024-04-27 RX ORDER — LISINOPRIL 10 MG/1
10 TABLET ORAL DAILY
Qty: 90 TABLET | Refills: 0 | Status: SHIPPED | OUTPATIENT
Start: 2024-04-27

## 2024-04-27 NOTE — TELEPHONE ENCOUNTER
Pt is at [facility] for his prescription of lisinopril 10 MG Oral Tab he stated that they need Dr approval because it is out of refills.

## 2024-06-16 DIAGNOSIS — E78.00 HYPERCHOLESTEREMIA: ICD-10-CM

## 2024-06-16 DIAGNOSIS — Z95.1 S/P CABG X 3: ICD-10-CM

## 2024-06-17 RX ORDER — ATORVASTATIN CALCIUM 40 MG/1
40 TABLET, FILM COATED ORAL NIGHTLY
Qty: 30 TABLET | Refills: 0 | Status: SHIPPED | OUTPATIENT
Start: 2024-06-17

## 2024-06-17 RX ORDER — ALBUTEROL SULFATE 90 UG/1
2 AEROSOL, METERED RESPIRATORY (INHALATION) 4 TIMES DAILY
Qty: 9 G | Refills: 0 | Status: SHIPPED | OUTPATIENT
Start: 2024-06-17

## 2024-06-17 NOTE — TELEPHONE ENCOUNTER
Detail Level: Simple Requested Prescriptions     Pending Prescriptions Disp Refills    ALBUTEROL 108 (90 Base) MCG/ACT Inhalation Aero Soln [Pharmacy Med Name: Albuterol Sulfate  (90 Base) MCG/ACT Inhalation Aerosol Solution] 9 g 0     Sig: INHALE 2 PUFFS BY MOUTH 4 TIMES DAILY     Last refill 9/14/23  x 3 refills  LOV 9/14/23  No future appointments.    Asthma & COPD Medication Protocol Oyypnm93/15/2024 01:58 PM   Protocol Details Asthma Action Score greater than or equal to 20    Appointment in past 6 or next 3 months    AAP/ACT given in last 12 months         Additional Notes: Patient consent was obtained to proceed with the visit and recommended plan of care after discussion of all risks and benefits, including the risks of COVID-19 exposure. Additional Notes: -patient to start using Clobetasol topical cream BID for 4 weeks only to affected areas on chest and leg \\n-patient received ILK injections today only to thickened areas \\n-treatment #1 today\\n-will discuss possibly sending patient to see Hamilton BENSON at next visit\\n-patient to follow up in 4 weeks Render Risk Assessment In Note?: no Detail Level: Detailed Additional Notes: Patient endorses a history of a bug bite to affected area

## 2024-06-17 NOTE — TELEPHONE ENCOUNTER
Requested Prescriptions     Pending Prescriptions Disp Refills    ATORVASTATIN 40 MG Oral Tab [Pharmacy Med Name: Atorvastatin Calcium 40 MG Oral Tablet] 90 tablet 0     Sig: Take 1 tablet by mouth nightly     Last refill 3/20/24 #90  LOV 9/14/23  No future appointments.  Patient is due for labs and office visit - reminder letter sent.

## 2024-06-30 DIAGNOSIS — Z95.1 S/P CABG X 3: ICD-10-CM

## 2024-06-30 DIAGNOSIS — I10 HYPERTENSION, UNSPECIFIED TYPE: ICD-10-CM

## 2024-07-01 ENCOUNTER — LABORATORY ENCOUNTER (OUTPATIENT)
Dept: LAB | Age: 64
End: 2024-07-01
Attending: FAMILY MEDICINE
Payer: COMMERCIAL

## 2024-07-01 DIAGNOSIS — E78.00 HYPERCHOLESTEREMIA: ICD-10-CM

## 2024-07-01 DIAGNOSIS — R73.9 HYPERGLYCEMIA: ICD-10-CM

## 2024-07-01 DIAGNOSIS — I10 HYPERTENSION, UNSPECIFIED TYPE: ICD-10-CM

## 2024-07-01 LAB
ALBUMIN SERPL-MCNC: 4.1 G/DL (ref 3.4–5)
ALBUMIN/GLOB SERPL: 1.2 {RATIO} (ref 1–2)
ALP LIVER SERPL-CCNC: 64 U/L
ALT SERPL-CCNC: 37 U/L
ANION GAP SERPL CALC-SCNC: 8 MMOL/L (ref 0–18)
AST SERPL-CCNC: 33 U/L (ref 15–37)
BILIRUB SERPL-MCNC: 1 MG/DL (ref 0.1–2)
BUN BLD-MCNC: 22 MG/DL (ref 9–23)
CALCIUM BLD-MCNC: 9.3 MG/DL (ref 8.5–10.1)
CHLORIDE SERPL-SCNC: 103 MMOL/L (ref 98–112)
CHOLEST SERPL-MCNC: 201 MG/DL (ref ?–200)
CO2 SERPL-SCNC: 26 MMOL/L (ref 21–32)
CREAT BLD-MCNC: 1.25 MG/DL
EGFRCR SERPLBLD CKD-EPI 2021: 65 ML/MIN/1.73M2 (ref 60–?)
FASTING PATIENT LIPID ANSWER: NO
FASTING STATUS PATIENT QL REPORTED: NO
GLOBULIN PLAS-MCNC: 3.5 G/DL (ref 2.8–4.4)
GLUCOSE BLD-MCNC: 75 MG/DL (ref 70–99)
HDLC SERPL-MCNC: 45 MG/DL (ref 40–59)
LDLC SERPL CALC-MCNC: 109 MG/DL (ref ?–100)
NONHDLC SERPL-MCNC: 156 MG/DL (ref ?–130)
OSMOLALITY SERPL CALC.SUM OF ELEC: 286 MOSM/KG (ref 275–295)
POTASSIUM SERPL-SCNC: 4.7 MMOL/L (ref 3.5–5.1)
PROT SERPL-MCNC: 7.6 G/DL (ref 6.4–8.2)
SODIUM SERPL-SCNC: 137 MMOL/L (ref 136–145)
TRIGL SERPL-MCNC: 273 MG/DL (ref 30–149)
VLDLC SERPL CALC-MCNC: 47 MG/DL (ref 0–30)

## 2024-07-01 PROCEDURE — 80053 COMPREHEN METABOLIC PANEL: CPT

## 2024-07-01 PROCEDURE — 36415 COLL VENOUS BLD VENIPUNCTURE: CPT

## 2024-07-01 PROCEDURE — 80061 LIPID PANEL: CPT

## 2024-07-01 RX ORDER — METOPROLOL TARTRATE 50 MG/1
TABLET, FILM COATED ORAL
Qty: 180 TABLET | Refills: 0 | Status: SHIPPED | OUTPATIENT
Start: 2024-07-01

## 2024-07-01 NOTE — TELEPHONE ENCOUNTER
OV 09/2023  LABS 09/23    REFILL 09/23 #180 +1 RF    No future appointments. Needs labs    BP Readings from Last 3 Encounters:   09/14/23 (!) 168/78   08/10/23 (!) 124/94   12/01/22 128/86

## 2024-07-05 DIAGNOSIS — I10 HYPERTENSION, UNSPECIFIED TYPE: Primary | ICD-10-CM

## 2024-07-05 DIAGNOSIS — E78.00 HYPERCHOLESTEREMIA: ICD-10-CM

## 2024-07-18 DIAGNOSIS — E78.00 HYPERCHOLESTEREMIA: ICD-10-CM

## 2024-07-18 DIAGNOSIS — Z95.1 S/P CABG X 3: ICD-10-CM

## 2024-07-19 ENCOUNTER — TELEPHONE (OUTPATIENT)
Dept: FAMILY MEDICINE CLINIC | Facility: CLINIC | Age: 64
End: 2024-07-19

## 2024-07-19 DIAGNOSIS — I10 HYPERTENSION, UNSPECIFIED TYPE: ICD-10-CM

## 2024-07-19 DIAGNOSIS — Z95.1 S/P CABG X 3: ICD-10-CM

## 2024-07-19 RX ORDER — ATORVASTATIN CALCIUM 40 MG/1
TABLET, FILM COATED ORAL
Qty: 30 TABLET | Refills: 0 | Status: SHIPPED | OUTPATIENT
Start: 2024-07-19

## 2024-07-19 RX ORDER — LISINOPRIL 10 MG/1
10 TABLET ORAL DAILY
Qty: 30 TABLET | Refills: 0 | Status: SHIPPED | OUTPATIENT
Start: 2024-07-19

## 2024-07-19 NOTE — TELEPHONE ENCOUNTER
Atorvastatin sent today #30    Lisinopril 04/27/24 #90    OV 09/14/23  LABS 07/01/24 COMP, LIPID    No future appointments. Spoke with patient to notify medications have been filled - states will back back to schedule in September.

## 2024-07-19 NOTE — TELEPHONE ENCOUNTER
ATORVASTATIN 40 MG Oral Tab     Cholesterol Medication Protocol Rzdytm0007/18/2024 06:32 PM   Protocol Details ALT < 80    ALT resulted within past year    Lipid panel within past 12 months    In person appointment or virtual visit in the past 12 mos or appointment in next 3 mos      Last office visit:  9/14/23  No future appointments.  Last filled:  6/17/24  #30  Last labs:  7/1/24  ALT:  37    Per last refill due for office visit and labs. Called and lm on identified VM that he is due for this and we need him to call and get this set up.

## 2024-07-19 NOTE — TELEPHONE ENCOUNTER
Atorvastatin, lisinopril     [facility] in [place]  he will make an appointment when his work schedule calms down.    He did have the labs done already he said.

## 2024-08-15 DIAGNOSIS — Z95.1 S/P CABG X 3: ICD-10-CM

## 2024-08-15 DIAGNOSIS — E78.00 HYPERCHOLESTEREMIA: ICD-10-CM

## 2024-08-15 DIAGNOSIS — I10 HYPERTENSION, UNSPECIFIED TYPE: ICD-10-CM

## 2024-08-15 NOTE — TELEPHONE ENCOUNTER
Last office visit: 9/14/23  Last refill: 7/19/24  Labs Due: 1/5/25  Future Appointments   Date Time Provider Department Center   9/16/2024  3:00 PM Amrik Leung MD [facility] [place]      atorvastatin 40 MG Oral Tab         Sig: N/A    Disp: 30 tablet    Refills: 0    Start: 8/15/2024    Class: Normal    Non-formulary For: S/P CABG x 3, Hypercholesteremia    Last ordered: 3 weeks ago (7/19/2024) by Amrik Leung MD    Cholesterol Medication Protocol Qpnmcx87/15/2024 03:02 PM   Protocol Details ALT < 80    ALT resulted within past year    Lipid panel within past 12 months    In person appointment or virtual visit in the past 12 mos or appointment in next 3 mos       lisinopril 10 MG Oral Tab         Sig: Take 1 tablet (10 mg total) by mouth daily.    Disp: 30 tablet    Refills: 0    Start: 8/15/2024    Class: Normal    Non-formulary For: S/P CABG x 3; Hypertension, unspecified type    Last ordered: 3 weeks ago (7/19/2024) by Amrik Leung MD    Hypertension Medications Protocol Dslbof50/15/2024 03:02 PM   Protocol Details Last BP reading less than 140/90    CMP or BMP in past 12 months    In person appointment or virtual visit in the past 12 mos or appointment in next 3 mos    EGFRCR or GFRNAA > 50      To be filled at: [facility] [address] [place] [address] [facility] [phone], [phone]

## 2024-08-16 RX ORDER — LISINOPRIL 10 MG/1
10 TABLET ORAL DAILY
Qty: 30 TABLET | Refills: 0 | Status: SHIPPED | OUTPATIENT
Start: 2024-08-16

## 2024-08-16 RX ORDER — ATORVASTATIN CALCIUM 40 MG/1
40 TABLET, FILM COATED ORAL NIGHTLY
Qty: 30 TABLET | Refills: 0 | Status: SHIPPED | OUTPATIENT
Start: 2024-08-16

## 2024-08-20 ENCOUNTER — PATIENT MESSAGE (OUTPATIENT)
Dept: FAMILY MEDICINE CLINIC | Facility: CLINIC | Age: 64
End: 2024-08-20

## 2024-08-20 DIAGNOSIS — Z95.1 S/P CABG X 3: ICD-10-CM

## 2024-08-20 DIAGNOSIS — E78.00 HYPERCHOLESTEREMIA: ICD-10-CM

## 2024-08-20 DIAGNOSIS — I10 HYPERTENSION, UNSPECIFIED TYPE: ICD-10-CM

## 2024-08-21 NOTE — TELEPHONE ENCOUNTER
Could I send 90 for Atorvastatin and lisinopril?     Future Appointments   Date Time Provider Department Center   9/17/2024  2:20 PM Amrik Leung MD [facility] [place]

## 2024-08-21 NOTE — TELEPHONE ENCOUNTER
From: Maxx Bacon  To: Nneka Garcia  Sent: 8/20/2024 8:51 PM CDT  Subject: Prescription refill by mail thought [employer] for 90 day supply     Prescription refill by mail thought [employer] for 90 day supply . Insurance not covering less than 90 day supply of all medication.

## 2024-08-23 RX ORDER — ATORVASTATIN CALCIUM 40 MG/1
40 TABLET, FILM COATED ORAL NIGHTLY
Qty: 90 TABLET | Refills: 0 | Status: SHIPPED | OUTPATIENT
Start: 2024-08-23

## 2024-08-23 RX ORDER — LISINOPRIL 10 MG/1
10 TABLET ORAL DAILY
Qty: 90 TABLET | Refills: 0 | Status: SHIPPED | OUTPATIENT
Start: 2024-08-23

## 2024-08-23 NOTE — TELEPHONE ENCOUNTER
Received fax from [facility] mail in pharmacy requesting 90 day supply of Lisinopril 10 mg and Atorvastatin 40 mg.    Call placed to patient to verify pharmacy as these prescriptions were sent to [facility], per patient prefers to use mail in pharmacy.    Prescriptions cancelled at [facility] and sent to [facility] as previously authorized.

## 2024-09-17 ENCOUNTER — OFFICE VISIT (OUTPATIENT)
Dept: FAMILY MEDICINE CLINIC | Facility: CLINIC | Age: 64
End: 2024-09-17
Payer: COMMERCIAL

## 2024-09-17 VITALS
HEIGHT: 67.75 IN | DIASTOLIC BLOOD PRESSURE: 68 MMHG | RESPIRATION RATE: 12 BRPM | HEART RATE: 97 BPM | OXYGEN SATURATION: 97 % | WEIGHT: 233.13 LBS | SYSTOLIC BLOOD PRESSURE: 130 MMHG | BODY MASS INDEX: 35.75 KG/M2 | TEMPERATURE: 97 F

## 2024-09-17 DIAGNOSIS — R73.9 HYPERGLYCEMIA: ICD-10-CM

## 2024-09-17 DIAGNOSIS — Z00.00 LABORATORY EXAMINATION ORDERED AS PART OF A ROUTINE GENERAL MEDICAL EXAMINATION: ICD-10-CM

## 2024-09-17 DIAGNOSIS — Z00.00 WELL ADULT EXAM: Primary | ICD-10-CM

## 2024-09-17 DIAGNOSIS — D12.5 BENIGN NEOPLASM OF SIGMOID COLON: ICD-10-CM

## 2024-09-17 DIAGNOSIS — Z11.59 ENCOUNTER FOR HEPATITIS C SCREENING TEST FOR LOW RISK PATIENT: ICD-10-CM

## 2024-09-17 DIAGNOSIS — Z13.29 SCREENING FOR THYROID DISORDER: ICD-10-CM

## 2024-09-17 DIAGNOSIS — I10 ESSENTIAL HYPERTENSION: ICD-10-CM

## 2024-09-17 DIAGNOSIS — Z13.0 SCREENING FOR IRON DEFICIENCY ANEMIA: ICD-10-CM

## 2024-09-17 DIAGNOSIS — E66.9 OBESITY (BMI 35.0-39.9 WITHOUT COMORBIDITY): ICD-10-CM

## 2024-09-17 DIAGNOSIS — D12.5 ADENOMATOUS POLYP OF SIGMOID COLON: ICD-10-CM

## 2024-09-17 DIAGNOSIS — I25.10 CORONARY ARTERY DISEASE INVOLVING NATIVE CORONARY ARTERY OF NATIVE HEART WITHOUT ANGINA PECTORIS: ICD-10-CM

## 2024-09-17 DIAGNOSIS — E78.5 DYSLIPIDEMIA: ICD-10-CM

## 2024-09-17 DIAGNOSIS — E78.00 HYPERCHOLESTEREMIA: ICD-10-CM

## 2024-09-17 PROBLEM — D12.2 BENIGN NEOPLASM OF ASCENDING COLON: Status: RESOLVED | Noted: 2021-08-13 | Resolved: 2024-09-17

## 2024-09-17 PROBLEM — R19.5 OCCULT BLOOD IN STOOLS: Status: RESOLVED | Noted: 2021-08-13 | Resolved: 2024-09-17

## 2024-09-17 PROBLEM — D12.3 BENIGN NEOPLASM OF TRANSVERSE COLON: Status: RESOLVED | Noted: 2021-08-13 | Resolved: 2024-09-17

## 2024-09-17 PROBLEM — I25.2 H/O NON-ST ELEVATION MYOCARDIAL INFARCTION (NSTEMI): Status: RESOLVED | Noted: 2018-05-17 | Resolved: 2024-09-17

## 2024-09-17 PROBLEM — K57.30 DIVERTICULOSIS OF LARGE INTESTINE WITHOUT PERFORATION OR ABSCESS WITHOUT BLEEDING: Status: RESOLVED | Noted: 2021-08-13 | Resolved: 2024-09-17

## 2024-09-17 PROCEDURE — 99396 PREV VISIT EST AGE 40-64: CPT | Performed by: FAMILY MEDICINE

## 2024-09-20 ENCOUNTER — LABORATORY ENCOUNTER (OUTPATIENT)
Dept: LAB | Age: 64
End: 2024-09-20
Attending: FAMILY MEDICINE
Payer: COMMERCIAL

## 2024-09-20 DIAGNOSIS — Z13.29 SCREENING FOR THYROID DISORDER: ICD-10-CM

## 2024-09-20 DIAGNOSIS — Z11.59 ENCOUNTER FOR HEPATITIS C SCREENING TEST FOR LOW RISK PATIENT: ICD-10-CM

## 2024-09-20 DIAGNOSIS — Z12.5 PROSTATE CANCER SCREENING: ICD-10-CM

## 2024-09-20 DIAGNOSIS — Z00.00 LABORATORY EXAMINATION ORDERED AS PART OF A ROUTINE GENERAL MEDICAL EXAMINATION: ICD-10-CM

## 2024-09-20 DIAGNOSIS — I10 ESSENTIAL HYPERTENSION: ICD-10-CM

## 2024-09-20 DIAGNOSIS — Z13.0 SCREENING FOR IRON DEFICIENCY ANEMIA: ICD-10-CM

## 2024-09-20 DIAGNOSIS — E78.5 DYSLIPIDEMIA: ICD-10-CM

## 2024-09-20 DIAGNOSIS — R73.9 HYPERGLYCEMIA: ICD-10-CM

## 2024-09-20 LAB
BASOPHILS # BLD AUTO: 0.05 X10(3) UL (ref 0–0.2)
BASOPHILS NFR BLD AUTO: 0.8 %
COMPLEXED PSA SERPL-MCNC: 0.56 NG/ML (ref ?–4)
EOSINOPHIL # BLD AUTO: 0.44 X10(3) UL (ref 0–0.7)
EOSINOPHIL NFR BLD AUTO: 7 %
ERYTHROCYTE [DISTWIDTH] IN BLOOD BY AUTOMATED COUNT: 13 %
EST. AVERAGE GLUCOSE BLD GHB EST-MCNC: 114 MG/DL (ref 68–126)
HBA1C MFR BLD: 5.6 % (ref ?–5.7)
HCT VFR BLD AUTO: 44.2 %
HCV AB SERPL QL IA: NONREACTIVE
HGB BLD-MCNC: 14.8 G/DL
IMM GRANULOCYTES # BLD AUTO: 0.01 X10(3) UL (ref 0–1)
IMM GRANULOCYTES NFR BLD: 0.2 %
LYMPHOCYTES # BLD AUTO: 1.99 X10(3) UL (ref 1–4)
LYMPHOCYTES NFR BLD AUTO: 31.4 %
MCH RBC QN AUTO: 31.2 PG (ref 26–34)
MCHC RBC AUTO-ENTMCNC: 33.5 G/DL (ref 31–37)
MCV RBC AUTO: 93.2 FL
MONOCYTES # BLD AUTO: 0.64 X10(3) UL (ref 0.1–1)
MONOCYTES NFR BLD AUTO: 10.1 %
NEUTROPHILS # BLD AUTO: 3.2 X10 (3) UL (ref 1.5–7.7)
NEUTROPHILS # BLD AUTO: 3.2 X10(3) UL (ref 1.5–7.7)
NEUTROPHILS NFR BLD AUTO: 50.5 %
PLATELET # BLD AUTO: 201 10(3)UL (ref 150–450)
RBC # BLD AUTO: 4.74 X10(6)UL
TSI SER-ACNC: 4.58 MIU/ML (ref 0.55–4.78)
WBC # BLD AUTO: 6.3 X10(3) UL (ref 4–11)

## 2024-09-20 PROCEDURE — 84443 ASSAY THYROID STIM HORMONE: CPT

## 2024-09-20 PROCEDURE — 85025 COMPLETE CBC W/AUTO DIFF WBC: CPT

## 2024-09-20 PROCEDURE — 36415 COLL VENOUS BLD VENIPUNCTURE: CPT

## 2024-09-20 PROCEDURE — 86803 HEPATITIS C AB TEST: CPT

## 2024-09-20 PROCEDURE — 83036 HEMOGLOBIN GLYCOSYLATED A1C: CPT

## 2024-09-21 NOTE — ASSESSMENT & PLAN NOTE
As for his cholesterol, Lipids are well controlled, no significant medication side effects noted.   PLAN: will continue present medications, reviewed diet, exercise and weight control, and labs as ordered

## 2024-09-21 NOTE — PROGRESS NOTES
Maxx Bacon is a 63 year old male.    CC:    Chief Complaint   Patient presents with    Physical       Subjective:     Chief Complaint Reviewed and Verified  No Further Nursing Notes to   Review  Tobacco Reviewed  Allergies Reviewed  Medications Reviewed    Problem List Reviewed  Medical History Reviewed  Surgical History   Reviewed  Family History Reviewed  Social History Reviewed         Here for complete physical examination    No issues  No complains of    No known injury  Feels well    Cholesterol shows Good control and Good compliance. Long term heart-healthy diet and lifestyle discussed and encouraged to reduce risk of cardiovascular disease.  7/1/2024: Cholesterol, Total 201 (H); HDL Cholesterol 45; Triglycerides 273 (H); LDL Cholesterol 109 (H)  Cholesterol Meds: atorvastatin Tabs - 40 MG  stable  Continue with current treatment plan  BP shows good control with last BP of 130/68. Continue lifestyle changes, diet, exercise and weight loss.   7/1/2024: Potassium 4.7; Creatinine 1.25; eGFR-Cr 65  BP Meds: lisinopril Tabs - 10 MG; metoprolol tartrate Tabs - 50 MG   ACE/ARB Adherence Excellent at 100%     On citalopram and does well    History/Other:   Allergies:  No Known Allergies   Current Meds:  has a current medication list which includes the following prescription(s): atorvastatin, lisinopril, metoprolol tartrate, albuterol, aspirin, and citalopram.      History:  Past Medical History:    Anxiety state    Benign neoplasm of ascending colon    Benign neoplasm of sigmoid colon    Benign neoplasm of transverse colon    CAD (coronary artery disease)    COPD (chronic obstructive pulmonary disease) (HCC)    Depression    Diverticulosis of large intestine without perforation or abscess without bleeding    H/O non-ST elevation myocardial infarction (NSTEMI)    Heart attack (HCC)    Hemorrhoids    none now    High cholesterol    under control    Hyperlipidemia LDL goal <70    Hypertension    S/P  CABG x 3    On 2018:CABG x 3      Past Surgical History:   Procedure Laterality Date    Cabg      On 2018:CABG x 3    Tonsillectomy        History reviewed. No pertinent family history.   No family status information on file.      Social History     Socioeconomic History    Marital status:    Occupational History     Employer: [employer]   Tobacco Use    Smoking status: Former     Current packs/day: 0.00     Average packs/day: 0.5 packs/day for 35.0 years (17.5 ttl pk-yrs)     Types: Cigarettes     Start date: 5/3/1983     Quit date: 5/3/2018     Years since quittin.3    Smokeless tobacco: Never   Vaping Use    Vaping status: Never Used   Substance and Sexual Activity    Alcohol use: Yes     Alcohol/week: 21.0 standard drinks of alcohol     Types: 21 Cans of beer per week     Comment: 3 beers daily    Drug use: No          Immunization History   Administered Date(s) Administered    Covid-19 Vaccine Pfizer 30 mcg/0.3 ml 2021, 2021, 2021    FLULAVAL 6 months & older 0.5 ml Prefilled syringe (45723) 2019    FLUZONE 6 months and older PFS 0.5 ml (58957) 2023    Flublok Quad Influenza Vaccine (26094) 11/15/2022    Pneumococcal (Prevnar 13) 2018    TDAP 2019         Wt Readings from Last 6 Encounters:   24 233 lb 2 oz (105.7 kg)   23 231 lb (104.8 kg)   08/10/23 230 lb (104.3 kg)   22 231 lb (104.8 kg)   22 230 lb 4 oz (104.4 kg)   21 215 lb (97.5 kg)       BP Readings from Last 3 Encounters:   24 130/68   23 (!) 168/78   08/10/23 (!) 124/94     REVIEW OF SYSTEMS:   GENERAL: feels well otherwise  SKIN: denies any unusual skin lesions  EYES:denies blurred vision or double vision  HEENT: denies nasal congestion, sinus pain or ST  LUNGS: denies shortness of breath with exertion  CARDIOVASCULAR: denies chest pain on exertion  GI: denies abdominal pain,denies heartburn   : denies nocturia or changes in  stream  MUSCULOSKELETAL: denies back pain  NEURO: denies headaches  PSYCHE: denies depression or anxiety    Objective:    EXAM:   Blood pressure 130/68, pulse 97, temperature 97 °F (36.1 °C), temperature source Temporal, resp. rate 12, height 5' 7.75\" (1.721 m), weight 233 lb 2 oz (105.7 kg), SpO2 97%.  Body mass index is 35.71 kg/m².      Reviewed by Dr Leung    GENERAL: well developed, well nourished,in no apparent distress  SKIN: no rashes,no suspicious lesions  HEENT: atraumatic, normocephalic,ears and throat are clear  EYES:PERRLA, EOMI, normal optic disk,conjunctiva are clear  NECK: supple,no adenopathy,no bruits  CHEST: no chest tenderness  LUNGS: clear to auscultation  CARDIO: RRR without murmur  GI: good BS's,no masses, HSM or tenderness  : defer  RECTAL: defer         MUSCULOSKELETAL: back is not tender,FROM of the back  EXTREMITIES: no cyanosis, clubbing or edema  NEURO: Oriented times three,cranial nerves are intact,motor and sensory are grossly intact    Assessment & Plan:       ASSESSMENT:    1. Well adult exam (Primary)  2. Essential hypertension  Overview:  Blood Pressure and Cardiac Medications            lisinopril 10 MG Oral Tab    METOPROLOL TARTRATE 50 MG Oral Tab            Assessment & Plan:  BP shows good control with last BP of 130/68. Continue lifestyle changes, diet, exercise and weight loss.   7/1/2024: Potassium 4.7; Creatinine 1.25; eGFR-Cr 65  BP Meds: lisinopril Tabs - 10 MG; metoprolol tartrate Tabs - 50 MG   ACE/ARB Adherence Excellent at 100%     Orders:  -     CARDIO - INTERNAL  -     TSH W Reflex To Free T4; Future; Expected date: 09/18/2024  -     CBC With Differential With Platelet; Future; Expected date: 09/18/2024  3. Hypercholesteremia  Overview:  Cholesterol Lowering Medications            atorvastatin 40 MG Oral Tab            Assessment & Plan:  As for his cholesterol, Lipids are well controlled, no significant medication side effects noted.   PLAN: will continue  present medications, reviewed diet, exercise and weight control, and labs as ordered      Orders:  -     CARDIO - INTERNAL  4. Coronary artery disease involving native coronary artery of native heart without angina pectoris  Overview:  Blood Pressure and Cardiac Medications            lisinopril 10 MG Oral Tab    METOPROLOL TARTRATE 50 MG Oral Tab            Assessment & Plan:  No chest pain  Needs aerobic exercise and monitor weight and blood pressure control  Orders:  -     CARDIO - INTERNAL  5. Obesity (BMI 35.0-39.9 without comorbidity)  Overview:  Estimated body mass index is 35.71 kg/m² as calculated from the following:    Height as of this encounter: 5' 7.75\" (1.721 m).    Weight as of this encounter: 233 lb 2 oz (105.7 kg).    Assessment & Plan:  Discussed weight loss through caloric reduction and aerobic exercise    6. Benign neoplasm of sigmoid colon  -     GASTRO - EXTERNAL  7. Adenomatous polyp of sigmoid colon  -     GASTRO - EXTERNAL  8. Screening for iron deficiency anemia  -     CBC With Differential With Platelet; Future; Expected date: 09/18/2024  9. Screening for thyroid disorder  -     TSH W Reflex To Free T4; Future; Expected date: 09/18/2024  10. Encounter for hepatitis C screening test for low risk patient  -     HCV Antibody; Future; Expected date: 09/18/2024  11. Laboratory examination ordered as part of a routine general medical examination  -     TSH W Reflex To Free T4; Future; Expected date: 09/18/2024  -     CBC With Differential With Platelet; Future; Expected date: 09/18/2024  -     Hemoglobin A1C; Future; Expected date: 09/18/2024  12. Hyperglycemia  -     Hemoglobin A1C; Future; Expected date: 09/18/2024  13. Dyslipidemia  -     TSH W Reflex To Free T4; Future; Expected date: 09/18/2024            Meds & Refills for this Visit:  Requested Prescriptions      No prescriptions requested or ordered in this encounter

## 2024-09-21 NOTE — ASSESSMENT & PLAN NOTE
BP shows good control with last BP of 130/68. Continue lifestyle changes, diet, exercise and weight loss.   7/1/2024: Potassium 4.7; Creatinine 1.25; eGFR-Cr 65  BP Meds: lisinopril Tabs - 10 MG; metoprolol tartrate Tabs - 50 MG   ACE/ARB Adherence Excellent at 100%

## 2024-09-25 DIAGNOSIS — E78.00 HYPERCHOLESTEREMIA: ICD-10-CM

## 2024-09-25 DIAGNOSIS — Z13.0 SCREENING FOR IRON DEFICIENCY ANEMIA: ICD-10-CM

## 2024-09-25 DIAGNOSIS — I10 ESSENTIAL HYPERTENSION: Primary | ICD-10-CM

## 2024-09-25 DIAGNOSIS — Z13.29 SCREENING FOR THYROID DISORDER: ICD-10-CM

## 2024-09-25 DIAGNOSIS — Z00.00 LABORATORY EXAMINATION ORDERED AS PART OF A ROUTINE GENERAL MEDICAL EXAMINATION: ICD-10-CM

## 2024-10-03 DIAGNOSIS — I10 HYPERTENSION, UNSPECIFIED TYPE: ICD-10-CM

## 2024-10-03 DIAGNOSIS — Z95.1 S/P CABG X 3: ICD-10-CM

## 2024-10-03 RX ORDER — METOPROLOL TARTRATE 50 MG
50 TABLET ORAL 2 TIMES DAILY
Qty: 180 TABLET | Refills: 1 | Status: SHIPPED | OUTPATIENT
Start: 2024-10-03 | End: 2024-10-07

## 2024-10-03 NOTE — TELEPHONE ENCOUNTER
OV 09/17/24  LABS 07/01/24 COMP    REFILL 07/01/24 #180    No future appointments.    BP Readings from Last 3 Encounters:   09/17/24 130/68   09/14/23 (!) 168/78   08/10/23 (!) 124/94

## 2024-10-06 DIAGNOSIS — I10 HYPERTENSION, UNSPECIFIED TYPE: ICD-10-CM

## 2024-10-06 DIAGNOSIS — Z95.1 S/P CABG X 3: ICD-10-CM

## 2024-10-07 ENCOUNTER — TELEPHONE (OUTPATIENT)
Dept: FAMILY MEDICINE CLINIC | Facility: CLINIC | Age: 64
End: 2024-10-07

## 2024-10-07 DIAGNOSIS — I10 HYPERTENSION, UNSPECIFIED TYPE: ICD-10-CM

## 2024-10-07 DIAGNOSIS — Z95.1 S/P CABG X 3: ICD-10-CM

## 2024-10-07 RX ORDER — METOPROLOL TARTRATE 50 MG
50 TABLET ORAL 2 TIMES DAILY
Qty: 14 TABLET | Refills: 0 | Status: SHIPPED | OUTPATIENT
Start: 2024-10-07

## 2024-10-07 RX ORDER — METOPROLOL TARTRATE 50 MG
50 TABLET ORAL 2 TIMES DAILY
Qty: 180 TABLET | Refills: 0 | OUTPATIENT
Start: 2024-10-07

## 2024-10-07 NOTE — TELEPHONE ENCOUNTER
metoprolol tartrate 50 MG Oral Tab -This was refilled a few days ago to mail order pharmacy.  He has been out of the medication for 2 days now.  He needs about a week or two sent to local pharmacy.  [facility]  Please call when refilled.

## 2024-12-08 DIAGNOSIS — I10 HYPERTENSION, UNSPECIFIED TYPE: ICD-10-CM

## 2024-12-08 DIAGNOSIS — Z95.1 S/P CABG X 3: ICD-10-CM

## 2024-12-08 DIAGNOSIS — E78.00 HYPERCHOLESTEREMIA: ICD-10-CM

## 2024-12-09 RX ORDER — LISINOPRIL 10 MG/1
10 TABLET ORAL DAILY
Qty: 30 TABLET | Refills: 0 | Status: SHIPPED | OUTPATIENT
Start: 2024-12-09 | End: 2024-12-09

## 2024-12-09 RX ORDER — ATORVASTATIN CALCIUM 40 MG/1
40 TABLET, FILM COATED ORAL NIGHTLY
Qty: 90 TABLET | Refills: 0 | Status: SHIPPED | OUTPATIENT
Start: 2024-12-09

## 2024-12-09 RX ORDER — ATORVASTATIN CALCIUM 40 MG/1
40 TABLET, FILM COATED ORAL NIGHTLY
Qty: 30 TABLET | Refills: 0 | Status: SHIPPED | OUTPATIENT
Start: 2024-12-09 | End: 2024-12-09

## 2024-12-09 RX ORDER — LISINOPRIL 10 MG/1
10 TABLET ORAL DAILY
Qty: 90 TABLET | Refills: 1 | Status: SHIPPED | OUTPATIENT
Start: 2024-12-09

## 2024-12-09 NOTE — TELEPHONE ENCOUNTER
LOV: 9/17/2024    LAST LAB: 9/20/2024    LAST RX:  atorvastatin 40 MG Oral Tab 90 tablet 0 8/23/2024 --   Sig:   Take 1 tablet (40 mg total) by mouth nightly.       lisinopril 10 MG Oral Tab 90 tablet 0 8/23/2024 --   Sig:   Take 1 tablet (10 mg total) by mouth daily.         Next OV: No future appointments.       PROTOCOL    Cholesterol Medication Protocol Glitat7712/08/2024 12:17 AM   Protocol Details ALT < 80    ALT resulted within past year    Lipid panel within past 12 months    In person appointment or virtual visit in the past 12 mos or appointment in next 3 mos          Hypertension Medications Protocol Lwnnta9612/08/2024 12:17 AM   Protocol Details CMP or BMP in past 12 months    Last BP reading less than 140/90    In person appointment or virtual visit in the past 12 mos or appointment in next 3 mos    EGFRCR or GFRNAA > 50

## 2025-01-05 DIAGNOSIS — Z95.1 S/P CABG X 3: ICD-10-CM

## 2025-01-05 DIAGNOSIS — I10 HYPERTENSION, UNSPECIFIED TYPE: ICD-10-CM

## 2025-01-06 RX ORDER — ALBUTEROL SULFATE 90 UG/1
2 INHALANT RESPIRATORY (INHALATION) 4 TIMES DAILY
Qty: 9 G | Refills: 0 | Status: SHIPPED | OUTPATIENT
Start: 2025-01-06

## 2025-01-06 RX ORDER — METOPROLOL TARTRATE 50 MG
50 TABLET ORAL 2 TIMES DAILY
Qty: 60 TABLET | Refills: 0 | Status: SHIPPED | OUTPATIENT
Start: 2025-01-06 | End: 2025-02-05

## 2025-01-06 NOTE — TELEPHONE ENCOUNTER
Asthma & COPD Medication Protocol Failed01/05/2025 04:30 PM   Protocol Details ACT Score greater than or equal to 20    ACT recorded in the last 12 months    Appointment in past 6 or next 3 months        Last refill - 6/17/24 - 9 g  Last office visit - 9/17/24

## 2025-01-06 NOTE — TELEPHONE ENCOUNTER
Quantity 14; please adjust quantity if appropriate  Last refill: 10/07/24  Qty: 14 tabs  W 0 refills  Last ov: 09/17/24  Last labs 09/20/24  Requested Prescriptions     Pending Prescriptions Disp Refills    METOPROLOL TARTRATE 50 MG Oral Tab [Pharmacy Med Name: Metoprolol Tartrate 50 MG Oral Tablet] 14 tablet 0     Sig: Take 1 tablet by mouth twice daily     No future appointments.

## 2025-02-08 DIAGNOSIS — Z95.1 S/P CABG X 3: ICD-10-CM

## 2025-02-08 DIAGNOSIS — I10 HYPERTENSION, UNSPECIFIED TYPE: ICD-10-CM

## 2025-02-10 RX ORDER — METOPROLOL TARTRATE 50 MG
50 TABLET ORAL 2 TIMES DAILY
Qty: 60 TABLET | Refills: 0 | Status: SHIPPED | OUTPATIENT
Start: 2025-02-10

## 2025-02-10 NOTE — TELEPHONE ENCOUNTER
OV 09/17/24  LABS 07/01/24 COMP    REFILL 01/06/25 #60    No future appointments.    BP Readings from Last 3 Encounters:   09/17/24 130/68   09/14/23 (!) 168/78   08/10/23 (!) 124/94

## 2025-02-17 ENCOUNTER — LABORATORY ENCOUNTER (OUTPATIENT)
Dept: LAB | Age: 65
End: 2025-02-17
Attending: FAMILY MEDICINE
Payer: COMMERCIAL

## 2025-02-17 DIAGNOSIS — E78.00 HYPERCHOLESTEREMIA: ICD-10-CM

## 2025-02-17 DIAGNOSIS — I10 ESSENTIAL HYPERTENSION: ICD-10-CM

## 2025-02-17 DIAGNOSIS — I10 HYPERTENSION, UNSPECIFIED TYPE: ICD-10-CM

## 2025-02-17 DIAGNOSIS — Z00.00 LABORATORY EXAMINATION ORDERED AS PART OF A ROUTINE GENERAL MEDICAL EXAMINATION: ICD-10-CM

## 2025-02-17 LAB
ALBUMIN SERPL-MCNC: 5 G/DL (ref 3.2–4.8)
ALBUMIN/GLOB SERPL: 2.2 {RATIO} (ref 1–2)
ALP LIVER SERPL-CCNC: 65 U/L
ALT SERPL-CCNC: 29 U/L
ANION GAP SERPL CALC-SCNC: 9 MMOL/L (ref 0–18)
AST SERPL-CCNC: 39 U/L (ref ?–34)
BILIRUB SERPL-MCNC: 1.4 MG/DL (ref 0.2–1.1)
BUN BLD-MCNC: 16 MG/DL (ref 9–23)
CALCIUM BLD-MCNC: 9.9 MG/DL (ref 8.7–10.6)
CHLORIDE SERPL-SCNC: 101 MMOL/L (ref 98–112)
CHOLEST SERPL-MCNC: 172 MG/DL (ref ?–200)
CO2 SERPL-SCNC: 30 MMOL/L (ref 21–32)
CREAT BLD-MCNC: 1.19 MG/DL
EGFRCR SERPLBLD CKD-EPI 2021: 68 ML/MIN/1.73M2 (ref 60–?)
FASTING PATIENT LIPID ANSWER: YES
FASTING STATUS PATIENT QL REPORTED: YES
GLOBULIN PLAS-MCNC: 2.3 G/DL (ref 2–3.5)
GLUCOSE BLD-MCNC: 92 MG/DL (ref 70–99)
HDLC SERPL-MCNC: 37 MG/DL (ref 40–59)
LDLC SERPL CALC-MCNC: 103 MG/DL (ref ?–100)
NONHDLC SERPL-MCNC: 135 MG/DL (ref ?–130)
OSMOLALITY SERPL CALC.SUM OF ELEC: 291 MOSM/KG (ref 275–295)
POTASSIUM SERPL-SCNC: 4.6 MMOL/L (ref 3.5–5.1)
PROT SERPL-MCNC: 7.3 G/DL (ref 5.7–8.2)
SODIUM SERPL-SCNC: 140 MMOL/L (ref 136–145)
TRIGL SERPL-MCNC: 183 MG/DL (ref 30–149)
VLDLC SERPL CALC-MCNC: 31 MG/DL (ref 0–30)

## 2025-02-17 PROCEDURE — 80061 LIPID PANEL: CPT

## 2025-02-17 PROCEDURE — 36415 COLL VENOUS BLD VENIPUNCTURE: CPT

## 2025-02-17 PROCEDURE — 80053 COMPREHEN METABOLIC PANEL: CPT

## 2025-02-21 DIAGNOSIS — E78.00 HYPERCHOLESTEREMIA: Primary | ICD-10-CM

## 2025-03-07 DIAGNOSIS — I10 HYPERTENSION, UNSPECIFIED TYPE: ICD-10-CM

## 2025-03-07 DIAGNOSIS — Z95.1 S/P CABG X 3: ICD-10-CM

## 2025-03-08 RX ORDER — METOPROLOL TARTRATE 50 MG
TABLET ORAL
Qty: 30 TABLET | Refills: 0 | Status: SHIPPED | OUTPATIENT
Start: 2025-03-08

## 2025-03-08 NOTE — TELEPHONE ENCOUNTER
Last refill: 02/10/25  Qty: 60  W/ 0 refills  Last ov: 09/17/24    Requested Prescriptions     Pending Prescriptions Disp Refills    METOPROLOL TARTRATE 50 MG Oral Tab [Pharmacy Med Name: Metoprolol Tartrate 50 MG Oral Tablet] 60 tablet 0     Sig: TAKE 1 TABLET BY MOUTH TWICE DAILY ...NEED  OV  PRIOR  TO  NEXT  REFILL     No future appointments.

## 2025-03-09 DIAGNOSIS — Z95.1 S/P CABG X 3: ICD-10-CM

## 2025-03-09 DIAGNOSIS — E78.00 HYPERCHOLESTEREMIA: ICD-10-CM

## 2025-03-10 RX ORDER — ATORVASTATIN CALCIUM 40 MG/1
40 TABLET, FILM COATED ORAL NIGHTLY
Qty: 90 TABLET | Refills: 0 | Status: SHIPPED | OUTPATIENT
Start: 2025-03-10

## 2025-03-10 NOTE — TELEPHONE ENCOUNTER
LOV: 9-  LAST LAB:9-  LAST RX:  Medication Quantity Refills Start End   atorvastatin 40 MG Oral Tab 90 tablet 0 12/9/2024 --   Sig:   Take 1 tablet (40 mg total) by mouth nightly.     Route:   Oral     Next OV: No future appointments.   PROTOCOL    Cholesterol Medication Protocol Gpqgmv9803/09/2025 05:16 PM   Protocol Details ALT < 80    ALT resulted within past year    Lipid panel within past 12 months    In person appointment or virtual visit in the past 12 mos or appointment in next 3 mos    Medication is active on med list

## 2025-03-20 DIAGNOSIS — I10 HYPERTENSION, UNSPECIFIED TYPE: ICD-10-CM

## 2025-03-20 DIAGNOSIS — Z95.1 S/P CABG X 3: ICD-10-CM

## 2025-03-20 RX ORDER — METOPROLOL TARTRATE 50 MG
50 TABLET ORAL 2 TIMES DAILY
Qty: 180 TABLET | Refills: 0 | Status: SHIPPED | OUTPATIENT
Start: 2025-03-20

## 2025-03-20 NOTE — TELEPHONE ENCOUNTER
REFILL metoprolol tartrate 50 MG Oral Tab, TAKES TWICE DAILY, 90 DAY SUPPLY TO [facility], PATIENT SAID LAST TIME IT WAS ONLY SENT OVER FOR 15 DAY SUPPLY, HE CAN NOT MAKE APPOINTMENT AT THIS TIME BECAUSE HE HAS NO INSURANCE

## 2025-03-20 NOTE — TELEPHONE ENCOUNTER
Request for refill on Metoprolol tartrate 50 mg    LOV  9-17-24  BP) 130/68    LAST LAB  2-17-25 Chem Profile creatinine 1.19/gfr 68    LAST RX  3-10-25  #15    Next OV  No future appointments.    PROTOCOL    Hypertension Medications Protocol Mngfhc8103/20/2025 02:19 PM   Protocol Details Medication is active on med list    CMP or BMP in past 12 months    Last BP reading less than 140/90    In person appointment or virtual visit in the past 12 mos or appointment in next 3 mos    EGFRCR or GFRNAA > 50          Reached out to patient and states usually comes in once yearly, agrees to check BP at home when able and call with reading.

## 2025-06-02 DIAGNOSIS — Z95.1 S/P CABG X 3: ICD-10-CM

## 2025-06-02 DIAGNOSIS — I10 HYPERTENSION, UNSPECIFIED TYPE: ICD-10-CM

## 2025-06-02 DIAGNOSIS — E78.00 HYPERCHOLESTEREMIA: ICD-10-CM

## 2025-06-03 RX ORDER — ATORVASTATIN CALCIUM 40 MG/1
40 TABLET, FILM COATED ORAL NIGHTLY
Qty: 90 TABLET | Refills: 0 | Status: SHIPPED | OUTPATIENT
Start: 2025-06-03

## 2025-06-03 RX ORDER — LISINOPRIL 10 MG/1
10 TABLET ORAL DAILY
Qty: 90 TABLET | Refills: 0 | Status: SHIPPED | OUTPATIENT
Start: 2025-06-03

## 2025-06-03 NOTE — TELEPHONE ENCOUNTER
OV 09/17/24  LABS 02/17/25 comp, lipid    REFILL  -Atorvastatin 40 mg 03/10/25 #90  -Lisinopril 10 mg 12/09/24 #90 +1 RF    No future appointments.    BP Readings from Last 3 Encounters:   09/17/24 130/68   09/14/23 (!) 168/78   08/10/23 (!) 124/94

## 2025-06-18 DIAGNOSIS — I10 HYPERTENSION, UNSPECIFIED TYPE: ICD-10-CM

## 2025-06-18 DIAGNOSIS — Z95.1 S/P CABG X 3: ICD-10-CM

## 2025-06-18 RX ORDER — METOPROLOL TARTRATE 50 MG
50 TABLET ORAL 2 TIMES DAILY
Qty: 180 TABLET | Refills: 0 | Status: SHIPPED | OUTPATIENT
Start: 2025-06-18

## 2025-06-18 NOTE — TELEPHONE ENCOUNTER
Last office visit: 9/17/24  Last refill: 3/20/25  Labs Due: 8/21/25  No future appointments.   Name from pharmacy: Metoprolol Tartrate 50 MG Oral Tablet         Will file in chart as: METOPROLOL TARTRATE 50 MG Oral Tab    Sig: Take 1 tablet by mouth twice daily    Disp: 180 tablet    Refills: 0    Start: 6/18/2025    Class: Normal    Non-formulary For: S/P CABG x 3; Hypertension, unspecified type    Last ordered: 3 months ago (3/20/2025) by Amrik Leung MD    Last refill: 3/20/2025    Rx #: [id number]    Hypertension Medications Protocol Svzdjp9906/18/2025 10:05 AM   Protocol Details CMP or BMP in past 12 months    Last BP reading less than 140/90    In person appointment or virtual visit in the past 12 mos or appointment in next 3 mos    EGFRCR or GFRNAA > 50    Medication is active on med list      To be filled at: [facility] [address] [place] [address] DRIVE [phone], [phone]

## (undated) DIAGNOSIS — E78.00 HYPERCHOLESTEREMIA: ICD-10-CM

## (undated) DIAGNOSIS — R73.9 HYPERGLYCEMIA: Primary | ICD-10-CM

## (undated) DIAGNOSIS — I10 ESSENTIAL HYPERTENSION, BENIGN: ICD-10-CM

## (undated) DIAGNOSIS — Z12.5 PROSTATE CANCER SCREENING: ICD-10-CM

## (undated) DEVICE — CELL SAVER BAG 600ML 4R2023

## (undated) DEVICE — TRANSPOSAL ULTRAFLEX DUO/QUAD ULTRA CART MANIFOLD

## (undated) DEVICE — GAUZE SPONGES,12 PLY: Brand: CURITY

## (undated) DEVICE — SOL LACT RINGERS 1000ML

## (undated) DEVICE — BLADE STERNAL SAW BULK PACK

## (undated) DEVICE — CELL SAVER 5/5+ BOWL KIT-225ML: Brand: HAEMONETICS CELL SAVER 5/5+ SYSTEMS

## (undated) DEVICE — SUTURE PROLENE 6-0 C-1

## (undated) DEVICE — SUTURE PROLENE 7-0 CC

## (undated) DEVICE — PDS II VLT 0 107CM AG ST3: Brand: ENDOLOOP

## (undated) DEVICE — TIBURON DRAPE TOWELS: Brand: CONVERTORS

## (undated) DEVICE — STERILE POLYISOPRENE POWDER-FREE SURGICAL GLOVES: Brand: PROTEXIS

## (undated) DEVICE — SUTURE POLYDEK 2-0

## (undated) DEVICE — SUTURE PROLENE 4-0 V-5

## (undated) DEVICE — OPEN HEART: Brand: MEDLINE INDUSTRIES, INC.

## (undated) DEVICE — TRAY ENDOVEIN KTV16 HARVESTING

## (undated) DEVICE — HEMOCLIP HORIZON SM MULTI

## (undated) DEVICE — FIXED CORE WIRE GUIDE SAFE-T-J, CURVED: Brand: COOK

## (undated) DEVICE — SPONGE LAP 18X18 XRAY STRL

## (undated) DEVICE — SUTURE VICRYL 3-0 PS-1

## (undated) DEVICE — SUTURE SILK 2-0

## (undated) DEVICE — CELL SAVER RESERVOIR BRAT

## (undated) DEVICE — GLOVE SURG TRIUMPH SZ 8

## (undated) DEVICE — Device

## (undated) DEVICE — DRAPE SLUSH/WARMER W/DISC

## (undated) DEVICE — SUTURE VICRYL 2-0 CT-1

## (undated) NOTE — LETTER
[address] [facility] FOR BLOOD OR BLOOD COMPONENTS      In the course of your treatment, it may become necessary to administer a transfusion of blood or blood components.  This form provides basic information concerning this proc explain the alternatives to you if it has not already been done. I,Maxx Bacon, have read/had read to me the above. I understand the matters bearing on the decision whether or not to authorize a transfusion of blood or blood components.  I have no ques

## (undated) NOTE — LETTER
Date: 10/18/2022    Patient Name: Juliet Mcallister          To Whom it may concern: The above patient was seen at the [facility] for treatment of a COVID. This patient should be excused from attending work 10/18 through 10/28, he is high risk due to heart and lung disease. The patient may return to work 10/29/2022 if asymptomatic.         Sincerely,    Roula Hathaway MD

## (undated) NOTE — LETTER
Kelly George M.D., F.A.C.S. Anthony Oneal M.D., F.A.C.S. Shruti España M.D., Allyson Allison. SHERLYN Tavera M.D., F.A.C.S. Mariah Rico. Araceli Maya M.D., F.A.C.S. All Lyle M.D. ANNEL Moon M.D., F. chance to have all of your questions and concerns answered. If there are any issues which have not been adequately addressed, we ask you to bring them forward so that we can thoroughly address them.     A patient who is fully informed and understands their treatment options:    Yes _____ No _____    A [facility] surgeon as explained to me that if I should so desire, he/she is willing to explain my case and the surgical and non-surgical options to family members:             Yes _____ No _____    A [facility] surgeon has an

## (undated) NOTE — LETTER
[facility] [address], [address] [place] [address]    Consent for Anesthesia   1.    Ciro Beaver agree to be cared for by an anesthesiologist, who is specially trained to monitor me and give me medicine to put me to sleep or keep me comforta · Rare risks include: remembering what happened during my procedure, allergic reactions to medications, injury to my airway, heart, lungs, vision, nerves, or muscles and in extremely rare instances death.   5. My doctor has explained to me other choices georgette Printed: 5/3/2018 at 11:35 AM     Medical Record #: [id number]                                            Page 1 of 1

## (undated) NOTE — LETTER
[facility]  Stefanie Jamesjoanne [id number] [facility], [address] [place] [address]    Consent for Operation    Date: __________________    Time: _______________    1.  I authorize the performance upon Ricky Shaw the following operation:    * coronary artery bypass graft    2 videotape. The [facility] will not be responsible for storage or maintenance of this tape. 6. For the purpose of advancing medical education, I consent to the admittance of observers to the Operating Room.     7. I authorize the use of any specimen, organs Signature of Patient:   ___________________________    When the patient is a minor or mentally incompetent to give consent:  Signature of person authorized to consent for patient: ___________________________   Relationship to patient: _____________________ drugs/illegal medications). Failure to inform my anesthesiologist about these medicines may increase my risk of anesthetic complications. · If I am allergic to anything or have had a reaction to anesthesia before.     3. I understand how the anesthesia med I have discussed the procedure and information above with the patient (or patient’s representative) and answered their questions. The patient or their representative has agreed to have anesthesia services.     _______________________________________________

## (undated) NOTE — LETTER
03/22/19          To Whom It May Concern,     Trinidad Leal is being treated for high blood pressure and is improving. Today , his BP was 148/90. Yesterday it was 150/94 and on 3/20 it was 130/88.       Sincerely,    Matt Silva D.O., MILENAFP

## (undated) NOTE — LETTER
05/18/21          To Whom It May Concern,    Due to medical illness, please excuse Ro Abdullahi from return to work until 5/25/21.   He will be reevaluated then to help determine ability to     Sincerely,      Zoya Rivers D.O., MILENAFP

## (undated) NOTE — LETTER
Date: 10/20/2022    Patient Name: Chino Mena          To Whom it may concern: This letter has been written at the patient's request. The above patient was seen at the [facility] for covid, tested positive 10/17. This patient should be excused from attending work 10/18-10/23 and may return on 10/24/2022 if not coughing ad MUST wear a mask for the next 5 days.         Sincerely,    Parish Conway MD

## (undated) NOTE — LETTER
05/25/21          To Whom It May Concern,    Due to medical illness, please excuse Adarsh Drake from work until 6/1/21. He may return to regular duty.     Sincerely,        Jannet Ennis D.O., FAAFP